# Patient Record
Sex: FEMALE | Race: WHITE | Employment: OTHER | URBAN - METROPOLITAN AREA
[De-identification: names, ages, dates, MRNs, and addresses within clinical notes are randomized per-mention and may not be internally consistent; named-entity substitution may affect disease eponyms.]

---

## 2017-02-10 ENCOUNTER — DOCUMENTATION ONLY (OUTPATIENT)
Dept: OTHER | Facility: CLINIC | Age: 82
End: 2017-02-10

## 2017-02-10 DIAGNOSIS — Z71.89 ADVANCED DIRECTIVES, COUNSELING/DISCUSSION: Primary | Chronic | ICD-10-CM

## 2017-02-22 VITALS
TEMPERATURE: 97 F | DIASTOLIC BLOOD PRESSURE: 68 MMHG | HEART RATE: 77 BPM | OXYGEN SATURATION: 95 % | WEIGHT: 123 LBS | RESPIRATION RATE: 18 BRPM | SYSTOLIC BLOOD PRESSURE: 124 MMHG | HEIGHT: 68 IN | BODY MASS INDEX: 18.64 KG/M2

## 2017-02-22 RX ORDER — LACTOSE-REDUCED FOOD
8 LIQUID (ML) ORAL
COMMUNITY
End: 2019-01-01

## 2017-02-23 ENCOUNTER — NURSING HOME VISIT (OUTPATIENT)
Dept: GERIATRICS | Facility: CLINIC | Age: 82
End: 2017-02-23
Payer: COMMERCIAL

## 2017-02-23 DIAGNOSIS — M80.88XG OSTEOPOROTIC COMPRESSION FRACTURE OF SPINE, WITH DELAYED HEALING, SUBSEQUENT ENCOUNTER: ICD-10-CM

## 2017-02-23 DIAGNOSIS — I48.0 PAF (PAROXYSMAL ATRIAL FIBRILLATION) (H): ICD-10-CM

## 2017-02-23 DIAGNOSIS — F02.80 LATE ONSET ALZHEIMER'S DISEASE WITHOUT BEHAVIORAL DISTURBANCE (H): ICD-10-CM

## 2017-02-23 DIAGNOSIS — Z86.73 HISTORY OF CVA (CEREBROVASCULAR ACCIDENT): ICD-10-CM

## 2017-02-23 DIAGNOSIS — K59.01 SLOW TRANSIT CONSTIPATION: ICD-10-CM

## 2017-02-23 DIAGNOSIS — I50.42 CHRONIC COMBINED SYSTOLIC AND DIASTOLIC CONGESTIVE HEART FAILURE (H): Primary | ICD-10-CM

## 2017-02-23 DIAGNOSIS — G30.1 LATE ONSET ALZHEIMER'S DISEASE WITHOUT BEHAVIORAL DISTURBANCE (H): ICD-10-CM

## 2017-02-23 PROCEDURE — 99308 SBSQ NF CARE LOW MDM 20: CPT | Performed by: INTERNAL MEDICINE

## 2017-02-23 NOTE — PROGRESS NOTES
"Essie Jernigan is a 89 year old female seen February 23, 2017 at [facility] where she has resided for 5 years (admit 2012) seen for routine visit.   Patient is seen in her room, resting abed late morning.   Pleasant and smiling, states she feels well.   \"I'm very wealthy you know, but I'm not giving any of it away.\"     Chart reviewed for pertinent medical history, which patient is not able to give.   In LTC since fall with pelvic fracture in 2012, unable to return home after TCU stay.   Had frontal lobe CVI in 2013, with resulting cognitive changes, no hospitalization since then.   Has a h/o HTN but not currently needing bp lowering meds; past h/o diastolic CHF    PMH:  PAF /Atrial flutter  Chronic diastolic CHF   ECHO 60-65% in 2013, JAMESON, MR, aortic sclerosis  Dementia, late onset  S/p right frontal lobe CVI, 2013  HTN  S/p hip fracture, 2007  S/p pelvic fracture, 2012  BCC, face  OA, s/p PADDY    SH:  , previously lived independently in an apartment.   Daughter, grandchildren live locally.     ROS:  Limited, but negative except for above.     EXAM:  Pleasant, NAD  /68  Pulse 77  Temp 97  F (36.1  C)  Resp 18  Ht 5' 8\" (1.727 m)  Wt 123 lb (55.8 kg)  SpO2 95%  BMI 18.7 kg/m2   +kyphosis  Neck supple without adenopathy  Lungs clear bilaterally with fair air movement  Heart RRR s1s2, 1/6 HSM  Abd soft, NT, no distention, +BS  Ext without edema, WC bound  Psych: affect pleasant  Neuro: non-focal.      Lab Results   Component Value Date    HGB 12.5 12/07/2016        Last Basic Metabolic Panel:  Lab Results   Component Value Date     12/07/2016      Lab Results   Component Value Date    POTASSIUM 4.3 12/07/2016     Lab Results   Component Value Date    CHLORIDE 105 12/07/2016     Lab Results   Component Value Date    RIK 8.1 12/07/2016     Lab Results   Component Value Date    CO2 29 12/07/2016     Lab Results   Component Value Date    BUN 23 12/07/2016     Lab Results   Component Value Date    CR 0.85 " 12/07/2016     Lab Results   Component Value Date    GLC 77 12/07/2016     TSH   Date Value Ref Range Status   12/07/2016 4.89 (H) 0.40 - 4.00 mU/L Final       IMP/PLAN:  (I50.42) Chronic combined systolic and diastolic congestive heart failure (H)   Comment: longstanding dx but stable fluid status for >one year.   Plan: follow for symptoms, treat if uncomfortable.       (I48.0) PAF (paroxysmal atrial fibrillation) (H)  Comment: atrial flutter, distant past; regular today  Plan: daily low dose aspirin, no need for rate slowing meds recently.     (G30.1,  F02.80) Late onset Alzheimer's disease without behavioral disturbance  Comment: general decline in STM and function  Plan: LTC support for meds, meals, activity, cares.       (M80.88XG) Osteoporotic compression fracture of spine, with delayed healing, subsequent encounter  Comment: occ pain  Plan: local measures, prn analgesia  Continue calcium, vit D for now    (Z86.73) History of CVA (cerebrovascular accident)  Comment: right frontal lobe  Plan: continue ASA    (K59.01) Slow transit constipation  Comment: well-managed  Plan: continue daily Senna, monitor    AD: DNR/DNI, comfort approach     Alejandra Orozco MD

## 2017-04-18 ENCOUNTER — NURSING HOME VISIT (OUTPATIENT)
Dept: GERIATRICS | Facility: CLINIC | Age: 82
End: 2017-04-18
Payer: COMMERCIAL

## 2017-04-18 VITALS
HEIGHT: 68 IN | TEMPERATURE: 98.9 F | DIASTOLIC BLOOD PRESSURE: 79 MMHG | WEIGHT: 122.8 LBS | OXYGEN SATURATION: 96 % | SYSTOLIC BLOOD PRESSURE: 152 MMHG | HEART RATE: 85 BPM | RESPIRATION RATE: 18 BRPM | BODY MASS INDEX: 18.61 KG/M2

## 2017-04-18 DIAGNOSIS — I10 ESSENTIAL HYPERTENSION, BENIGN: ICD-10-CM

## 2017-04-18 DIAGNOSIS — G30.1 LATE ONSET ALZHEIMER'S DISEASE WITHOUT BEHAVIORAL DISTURBANCE (H): ICD-10-CM

## 2017-04-18 DIAGNOSIS — F02.80 LATE ONSET ALZHEIMER'S DISEASE WITHOUT BEHAVIORAL DISTURBANCE (H): ICD-10-CM

## 2017-04-18 DIAGNOSIS — I50.42 CHRONIC COMBINED SYSTOLIC AND DIASTOLIC CONGESTIVE HEART FAILURE (H): Primary | ICD-10-CM

## 2017-04-18 PROCEDURE — 99309 SBSQ NF CARE MODERATE MDM 30: CPT | Performed by: NURSE PRACTITIONER

## 2017-04-18 NOTE — PROGRESS NOTES
[place] [facility]    Chief Complaint   Patient presents with     USP Regulatory       HPI:    Essie Jernigan is a 89 year old  (8/9/1920), who is being seen today for a federally mandated E/M visit at [facility]. Today's concerns are:  Chronic combined systolic and diastolic congestive heart failure (H)  Essential hypertension, benign  Denies any dizziness sob, chest pain or edema. Blood pressures ranging between 102//88.    Late onset Alzheimer's disease without behavioral disturbance  Weight has been stable at 122.8lbs. Feeds herself and toilets self. Denies any discomfort.       ALLERGIES: Review of patient's allergies indicates no known allergies.  PAST MEDICAL HISTORY:  has a past medical history of Irregular heart beat and Pelvic fracture (H) (11/14/12).  PAST SURGICAL HISTORY:  has a past surgical history that includes GYN surgery (hyst); orthopedic surgery (wrist, hip replacement); and appendectomy.  FAMILY HISTORY: family history is not on file.  SOCIAL HISTORY:  reports that she has never smoked. She does not have any smokeless tobacco history on file. She reports that she does not drink alcohol.    MEDICATIONS:  Current Outpatient Prescriptions   Medication Sig Dispense Refill     Nutritional Supplements (ENSURE PLUS) LIQD Take 8 oz by mouth 3 times daily (with meals)       AMOXICILLIN PO Give 2 grams by mouth 1 hour prior to dental appts       senna-docusate (SENOKOT-S;PERICOLACE) 8.6-50 MG per tablet Take 1 tablet by mouth 2 times daily       VITAMIN D, CHOLECALCIFEROL, PO Take 2,000 Units by mouth daily       aspirin 81 MG tablet Take by mouth daily       acetaminophen (TYLENOL) 325 MG tablet Take 650 mg by mouth every 4 hours as needed        calcium carb 1250 mg, 500 mg [place],/vitamin D 200 units (OSCAL WITH D) 500-200 MG-UNIT per tablet Take 1 tablet by mouth 2 times daily (with meals). 100 tablet      multivitamin, therapeutic (THERA-VIT) TABS Take 1  "tablet by mouth daily.       Medications reviewed:  Medications reconciled to facility chart and changes were made to reflect current medications as identified as above med list. Below are the changes that were made:   Medications stopped since last EPIC medication reconciliation:   There are no discontinued medications.    Medications started since last [facility] medication reconciliation:  No orders of the defined types were placed in this encounter.      Case Management:  I have reviewed the care plan and MDS and do agree with the plan. Patient's desire to return to the community is not assessible due to cognitive impairment.  Information reviewed:  Medications, vital signs, orders, and nursing notes.    ROS:  4 point ROS including Respiratory, CV, GI and , other than that noted in the HPI,  is negative    Exam:  /79  Pulse 85  Temp 98.9  F (37.2  C)  Resp 18  Ht 5' 8\" (1.727 m)  Wt 122 lb 12.8 oz (55.7 kg)  SpO2 96%  BMI 18.67 kg/m2  GENERAL APPEARANCE:  Alert, in no distress  ENT:  Mouth and posterior oropharynx normal, moist mucous membranes, hearing acuity normal  EYES:  EOM, conjunctivae, lids, pupils and irises normal  NECK:  No adenopathy,masses or thyromegaly  RESP:  respiratory effort and palpation of chest normal, no respiratory distress, Lung sounds clear bilaterally  CV:  Palpation and auscultation of heart done , rate and rhythm regular, no murmur, no rub or gallop, Edema none  ABDOMEN:  normal bowel sounds, soft, nontender, no hepatosplenomegaly or other masses  M/S:   Gait and station unsteady gait, wheelchair bound, Digits and nails normal  SKIN:  Inspection/Palpation of skin and subcutaneous tissue normal  NEURO: 2-12 in normal limits and at patient's baseline  PSYCH:  insight and judgement, memory impaired, affect and mood normal, jovial mood.    BP: 146-167/77-88  P: 85-97  Wt Readings from Last 5 Encounters:   04/18/17 122 lb 12.8 oz (55.7 kg)   02/22/17 123 lb (55.8 kg)   12/06/16 " 114 lb 12.8 oz (52.1 kg)   06/20/16 115 lb 11.2 oz (52.5 kg)   04/14/16 115 lb 12.8 oz (52.5 kg)         Lab/Diagnostic data:    No recent labs      ASSESSMENT/PLAN  (I50.42) Chronic combined systolic and diastolic congestive heart failure (H)  (primary encounter diagnosis)  Comment: chronic but controlled without medications  Plan: continue current plan of care    (I10) Essential hypertension, benign  Comment: controlled, not on medication  Plan: continue current plan of care    (G30.1,  F02.80) Late onset Alzheimer's disease without behavioral disturbance  Comment: chronic.  Plan: continue current plan of care    Orders:  No new orders.    Total time spent with patient visit was 35 min including patient visit and review of past records.Greater than 50% of total time spent with counseling and coordinating care.    Electronically signed by:  DEAN Ferro CNP

## 2017-06-01 ENCOUNTER — NURSING HOME VISIT (OUTPATIENT)
Dept: GERIATRICS | Facility: CLINIC | Age: 82
End: 2017-06-01
Payer: COMMERCIAL

## 2017-06-01 VITALS
BODY MASS INDEX: 19.02 KG/M2 | HEART RATE: 95 BPM | TEMPERATURE: 95.8 F | RESPIRATION RATE: 18 BRPM | OXYGEN SATURATION: 98 % | SYSTOLIC BLOOD PRESSURE: 113 MMHG | WEIGHT: 125.5 LBS | DIASTOLIC BLOOD PRESSURE: 74 MMHG | HEIGHT: 68 IN

## 2017-06-01 DIAGNOSIS — G30.1 LATE ONSET ALZHEIMER'S DISEASE WITHOUT BEHAVIORAL DISTURBANCE (H): Primary | ICD-10-CM

## 2017-06-01 DIAGNOSIS — M80.88XG OSTEOPOROTIC COMPRESSION FRACTURE OF SPINE, WITH DELAYED HEALING, SUBSEQUENT ENCOUNTER: ICD-10-CM

## 2017-06-01 DIAGNOSIS — F02.80 LATE ONSET ALZHEIMER'S DISEASE WITHOUT BEHAVIORAL DISTURBANCE (H): Primary | ICD-10-CM

## 2017-06-01 DIAGNOSIS — I50.42 CHRONIC COMBINED SYSTOLIC AND DIASTOLIC CONGESTIVE HEART FAILURE (H): ICD-10-CM

## 2017-06-01 PROCEDURE — 99308 SBSQ NF CARE LOW MDM 20: CPT | Performed by: INTERNAL MEDICINE

## 2017-06-01 PROCEDURE — 99207 ZZC CDG-CORRECTLY CODED, REVIEWED AND AGREE: CPT | Performed by: INTERNAL MEDICINE

## 2017-06-10 NOTE — PROGRESS NOTES
"Essie Jernigan is a 89 year old female seen June 1, 2017 at [facility] where she has resided for 5 years (admit 2012) seen for routine visit.   Patient is seen in her room, up to WC.   She is pleasant and conversational, states she feels well.   Denies pain, dyspnea or other symptoms.   Spirits are good.     Chart reviewed for pertinent medical history: In LTC since fall with pelvic fracture in 2012.   Had frontal lobe CVI in 2013, with resulting cognitive changes, no hospitalization since then.   Has a h/o HTN but not currently needing bp lowering meds; past h/o diastolic CHF    PMH:  PAF /Atrial flutter  Chronic diastolic CHF   ECHO 60-65% in 2013, JAMESON, MR, aortic sclerosis  Dementia, late onset  S/p right frontal lobe CVI, 2013  HTN  S/p hip fracture, 2007  S/p pelvic fracture, 2012  BCC, face  OA, s/p PADDY    SH:  , previously lived independently in an apartment.   Daughter, grandchildren live locally.     ROS:  Limited, but negative except for above.   Weight is stable.       EXAM:  Pleasant, NAD  /74  Pulse 95  Temp 95.8  F (35.4  C)  Resp 18  Ht 5' 8\" (1.727 m)  Wt 125 lb 8 oz (56.9 kg)  SpO2 98%  BMI 19.08 kg/m2   +kyphosis  Neck supple without adenopathy  Lungs clear bilaterally with fair air movement  Heart RRR s1s2, 1/6 HSM  Abd soft, NT, no distention, +BS  Ext without edema, WC bound  Psych: affect pleasant  Neuro: non-focal.    No recent labs.      IMP/PLAN:  (I50.42) Chronic combined systolic and diastolic congestive heart failure (H)   Comment: longstanding dx but stable fluid status for >one year.   Plan: follow for symptoms, treat if uncomfortable.       (I48.0) PAF (paroxysmal atrial fibrillation) (H)  Comment: controlled VR without rate-slowing medications.    Plan: daily low dose aspirin    (G30.1,  F02.80) Late onset Alzheimer's disease without behavioral disturbance  Comment: gradual decline  Plan: LTC support for meds, meals, activity, cares.       (M80.88XG) Osteoporotic " compression fracture of spine  Comment: occ pain  Plan: local measures, prn analgesia  Continue calcium, vit D for now    (Z86.73) History of CVA (cerebrovascular accident)  Comment: right frontal lobe  Plan: continue ASA    (K59.01) Slow transit constipation  Comment: well-managed  Plan: continue daily Senna, monitor    AD: DNR/DNI, comfort approach     Alejandra Orozco MD

## 2017-08-03 ENCOUNTER — NURSING HOME VISIT (OUTPATIENT)
Dept: GERIATRICS | Facility: CLINIC | Age: 82
End: 2017-08-03
Payer: COMMERCIAL

## 2017-08-03 VITALS
DIASTOLIC BLOOD PRESSURE: 72 MMHG | OXYGEN SATURATION: 96 % | HEIGHT: 68 IN | TEMPERATURE: 97.6 F | WEIGHT: 124.2 LBS | SYSTOLIC BLOOD PRESSURE: 125 MMHG | BODY MASS INDEX: 18.82 KG/M2 | RESPIRATION RATE: 18 BRPM | HEART RATE: 70 BPM

## 2017-08-03 DIAGNOSIS — Z87.81 H/O COMPRESSION FRACTURE OF SPINE: ICD-10-CM

## 2017-08-03 DIAGNOSIS — F02.80 LATE ONSET ALZHEIMER'S DISEASE WITHOUT BEHAVIORAL DISTURBANCE (H): Primary | ICD-10-CM

## 2017-08-03 DIAGNOSIS — R53.81 PHYSICAL DECONDITIONING: ICD-10-CM

## 2017-08-03 DIAGNOSIS — Z86.73 HISTORY OF CVA (CEREBROVASCULAR ACCIDENT): ICD-10-CM

## 2017-08-03 DIAGNOSIS — G30.1 LATE ONSET ALZHEIMER'S DISEASE WITHOUT BEHAVIORAL DISTURBANCE (H): Primary | ICD-10-CM

## 2017-08-03 PROCEDURE — 99309 SBSQ NF CARE MODERATE MDM 30: CPT | Performed by: NURSE PRACTITIONER

## 2017-08-03 NOTE — PROGRESS NOTES
[place] [facility]    Chief Complaint   Patient presents with     long term Regulatory       HPI:    Essie Jernigan is a 89 year old  (8/9/1920), who is being seen today for a federally mandated E/M visit at [facility].  HPI information obtained from: facility chart records, facility staff, patient report and [facility] chart review. Today's concerns are:    H/o compression fracture.   No c/o pain today.   She is taking vit d and oscal daily. No recent falls or fractures.  Late onset Alzheimer's disease without behavioral disturbance  Pleasant, able [place] me [place]. She did go to an activity today.   History of CVA (cerebrovascular accident)  12/19/13- right frontal on MRI with left facial droop, dysarthria and confusion. Today with no focal deficits  BIMS 11/15  Physical deconditioning  Uses wheelchair to get about.       ALLERGIES: Review of patient's allergies indicates no known allergies.  PAST MEDICAL HISTORY:  has a past medical history of Irregular heart beat and Pelvic fracture (H) (11/14/12).  PAST SURGICAL HISTORY:  has a past surgical history that includes GYN surgery (hyst); orthopedic surgery (wrist, hip replacement); and appendectomy.  FAMILY HISTORY: family history is not on file.  SOCIAL HISTORY:  reports that she has never smoked. She does not have any smokeless tobacco history on file. She reports that she does not drink alcohol.    MEDICATIONS:  Current Outpatient Prescriptions   Medication Sig Dispense Refill     IBUPROFEN PO Take 400 mg by mouth every 6 hours as needed for moderate pain       Nutritional Supplements (ENSURE PLUS) LIQD Take 8 oz by mouth 3 times daily (with meals)       AMOXICILLIN PO Give 2 grams by mouth 1 hour prior to dental appts       senna-docusate (SENOKOT-S;PERICOLACE) 8.6-50 MG per tablet Take 1 tablet by mouth 2 times daily       VITAMIN D, CHOLECALCIFEROL, PO Take 2,000 Units by mouth daily       aspirin 81  "MG tablet Take by mouth daily       acetaminophen (TYLENOL) 325 MG tablet Take 650 mg by mouth every 6 hours as needed        multivitamin, therapeutic (THERA-VIT) TABS Take 1 tablet by mouth daily.       Medications reviewed:  Medications reconciled to facility chart and changes were made to reflect current medications as identified as above med list. Below are the changes that were made:   Medications stopped since last EPIC medication reconciliation:   Medications Discontinued During This Encounter   Medication Reason     calcium carb 1250 mg, 500 mg [place],/vitamin D 200 units (OSCAL WITH D) 500-200 MG-UNIT per tablet Medication Reconciliation Clean Up       Medications started since last [facility] medication reconciliation:  Orders Placed This Encounter   Medications     IBUPROFEN PO     Sig: Take 400 mg by mouth every 6 hours as needed for moderate pain         Case Management:  I have reviewed the care plan and MDS and do agree with the plan. Patient's desire to return to the community is not present.  Information reviewed:  Medications, vital signs, orders, and nursing notes.    ROS:  4 point ROS including Respiratory, CV, GI and , other than that noted in the HPI,  is negative    Exam:  Vitals: /72  Pulse 70  Temp 97.6  F (36.4  C)  Resp 18  Ht 5' 8\" (1.727 m)  Wt 124 lb 3.2 oz (56.3 kg)  SpO2 96%  BMI 18.88 kg/m2  BMI= Body mass index is 18.88 kg/(m^2).  GENERAL APPEARANCE:  Alert, in no distress  ENT:  Mouth and posterior oropharynx normal, moist mucous membranes, hearing acuity adequate   EYES:  EOM, conjunctivae, lids, pupils and irises normal  NECK:  No adenopathy,masses or thyromegaly  RESP:  respiratory effort and palpation of chest normal, no respiratory distress, Lung sounds clear  CV:  Palpation and auscultation of heart done , rate and rhythm reg, no murmur, no rub or gallop, Edema none  ABDOMEN:  normal bowel sounds, soft, nontender, no hepatosplenomegaly or other masses  M/S:   Gait and " station LE weakness. , Digits and nails normal   SKIN:  Inspection/Palpation of skin and subcutaneous tissue no rash  NEURO: 2-12 in normal limits and at patient's baseline  PSYCH:  insight and judgement, memory mild cognitive impairment  , affect and mood normal    BP: 125/72, 128/72, 172/87  P:   Wt Readings from Last 5 Encounters:   08/03/17 124 lb 3.2 oz (56.3 kg)   06/01/17 125 lb 8 oz (56.9 kg)   04/18/17 122 lb 12.8 oz (55.7 kg)   02/22/17 123 lb (55.8 kg)   12/06/16 114 lb 12.8 oz (52.1 kg)         Lab/Diagnostic data:    No recent labs        ASSESSMENT/PLAN  (G30.1,  F02.80) Late onset Alzheimer's disease without behavioral disturbance  (primary encounter diagnosis)  Comment: pleasant. Able to take care of immediate personal needs.   Plan: supportive caer    (Z87.81) H/O compression fracture of spine  Comment: no recent falls or episodes of pain  Plan: continue vit D and Calcium supp    (R53.81) Physical deconditioning  Comment: due to inactivity.   Plan: assist with exercises    (Z86.73) History of CVA (cerebrovascular accident)  Comment: nonfocal  Plan: supportive care        Electronically signed by:  DEAN Lei CNP

## 2017-08-10 ENCOUNTER — DOCUMENTATION ONLY (OUTPATIENT)
Dept: OTHER | Facility: CLINIC | Age: 82
End: 2017-08-10

## 2017-08-10 DIAGNOSIS — Z71.89 ADVANCED DIRECTIVES, COUNSELING/DISCUSSION: Chronic | ICD-10-CM

## 2017-10-19 VITALS
WEIGHT: 122.4 LBS | SYSTOLIC BLOOD PRESSURE: 108 MMHG | BODY MASS INDEX: 18.55 KG/M2 | HEIGHT: 68 IN | HEART RATE: 70 BPM | DIASTOLIC BLOOD PRESSURE: 70 MMHG | OXYGEN SATURATION: 95 % | TEMPERATURE: 97.7 F | RESPIRATION RATE: 18 BRPM

## 2017-10-20 ENCOUNTER — NURSING HOME VISIT (OUTPATIENT)
Dept: GERIATRICS | Facility: CLINIC | Age: 82
End: 2017-10-20
Payer: COMMERCIAL

## 2017-10-20 DIAGNOSIS — F02.80 LATE ONSET ALZHEIMER'S DISEASE WITHOUT BEHAVIORAL DISTURBANCE (H): ICD-10-CM

## 2017-10-20 DIAGNOSIS — G30.1 LATE ONSET ALZHEIMER'S DISEASE WITHOUT BEHAVIORAL DISTURBANCE (H): ICD-10-CM

## 2017-10-20 DIAGNOSIS — I50.42 CHRONIC COMBINED SYSTOLIC AND DIASTOLIC CONGESTIVE HEART FAILURE (H): ICD-10-CM

## 2017-10-20 DIAGNOSIS — K59.01 SLOW TRANSIT CONSTIPATION: ICD-10-CM

## 2017-10-20 DIAGNOSIS — Z86.73 HISTORY OF CVA (CEREBROVASCULAR ACCIDENT): Primary | ICD-10-CM

## 2017-10-20 PROCEDURE — 99308 SBSQ NF CARE LOW MDM 20: CPT | Performed by: INTERNAL MEDICINE

## 2017-10-20 PROCEDURE — 99207 ZZC CDG-CORRECTLY CODED, REVIEWED AND AGREE: CPT | Performed by: INTERNAL MEDICINE

## 2017-10-29 NOTE — PROGRESS NOTES
"Essie Jernigan is a 89 year old female seen October 20, 2017 at [facility] where she has resided for 5 years (admit 2012) seen for routine visit.     Patient is seen in her room, resting abed.   Denies pain, dyspnea or other symptoms.   Active intermittently, uses WC for mobility.     No new concerns reported by LT nursing staff.       Chart reviewed for pertinent medical history: In LTC since fall with pelvic fracture in 2012.   Had frontal lobe CVI in 2013, with resulting cognitive changes, no hospitalization since then.   Has a h/o HTN but not currently needing bp lowering meds; she has a h/o diastolic CHF    PMH:  PAF /Atrial flutter  Chronic diastolic CHF   ECHO 60-65% in 2013, JAMESON, MR, aortic sclerosis  Dementia, late onset  S/p right frontal lobe CVI, 2013  HTN  S/p hip fracture, 2007  S/p pelvic fracture, 2012  BCC, face  OA, s/p PADDY    SH:  , previously lived independently in an apartment.   Daughter, grandchildren live locally.     ROS:  Limited, but negative except for above.   Weight is stable.       EXAM:  Pleasant, NAD  /70  Pulse 70  Temp 97.7  F (36.5  C)  Resp 18  Ht 5' 8\" (1.727 m)  Wt 122 lb 6.4 oz (55.5 kg)  SpO2 95%  BMI 18.61 kg/m2   +kyphosis  Neck supple without adenopathy  Lungs clear bilaterally with fair air movement  Heart RRR s1s2, 1/6 HSM  Abd soft, NT, no distention, +BS  Ext without edema, WC bound  Psych: affect pleasant  Neuro: non-focal.    No recent labs.      IMP/PLAN:  (I50.42) Chronic combined systolic and diastolic congestive heart failure (H)   Comment: longstanding dx but stable fluid status for >one year.   Plan: follow for symptoms, treat if uncomfortable.       (I48.0) PAF (paroxysmal atrial fibrillation) (H)  Comment: controlled VR without rate-slowing medications.    Plan: daily low dose aspirin    (G30.1,  F02.80) Late onset Alzheimer's disease without behavioral disturbance  Comment: gradual decline  Plan: LTC support for meds, meals, activity, " bety.       (M80.88XG) Osteoporotic compression fracture of spine  Comment: occ pain  Plan: local measures, prn analgesia  Continue calcium, vit D for now  Would d/c ibuprofen.      (Z86.73) History of CVA (cerebrovascular accident)  Comment: right frontal lobe  Plan: continue ASA for secondary prevention.     (K59.01) Slow transit constipation  Comment: well-managed  Plan: continue daily Senna, monitor    AD: DNR/DNI, comfort approach     Alejandra Orozco MD

## 2017-11-22 VITALS
BODY MASS INDEX: 19.4 KG/M2 | RESPIRATION RATE: 18 BRPM | HEIGHT: 68 IN | HEART RATE: 70 BPM | WEIGHT: 128 LBS | SYSTOLIC BLOOD PRESSURE: 108 MMHG | OXYGEN SATURATION: 95 % | DIASTOLIC BLOOD PRESSURE: 70 MMHG | TEMPERATURE: 97.7 F

## 2017-11-22 ASSESSMENT — PATIENT HEALTH QUESTIONNAIRE - PHQ9: SUM OF ALL RESPONSES TO PHQ QUESTIONS 1-9: 0

## 2017-11-22 NOTE — PROGRESS NOTES
"[facility]  Chief Complaint   Patient presents with     Annual Comprehensive Nursing Home       HPI:    Essie Jernigan is a 89 year old  (8/9/1920) female with PMH significant for PAF/a.flutter, HFpEF, dementida, hx of CVA (right frontal lobe), OA (s/p PADDY), who is being seen today for an annual comprehensive visit at [facility] (formerly [facility]).  HPI information obtained from: facility chart records, facility staff, patient report and [facility] chart review.      Today's concerns are:  Late onset Alzheimer's disease without behavioral disturbance  When I ask how old she is, tells me 89. Then says: \"I feel pretty good for 89 years old!\"  Scored 16/30 in SLUMS in September 2013.  Has resided on LTC unit since 2012 after TCU stay for rehab after L superior and inferior pubic rami fracture.  Needs assistance with ADLs, mobility, safety, medications, and meals.  Her mood and behavior have been stable per staff and family.     Chronic combined systolic and diastolic congestive heart failure (H)  Essential hypertension, benign  Not on any cardiovascular medications.  No CP or SOB. No leg swelling.  Recent VS reviewed:  BP: 108-117/64-89  P: 70-81    Slow transit constipation  Having regular BMs.On senna-s one tablet PO BID.  No abd pain. No N/V.   Good appetite: \"I eat everything, like a little piggy.\"  She is thin, but weight is stable ~ 125#.  She is on nutritional supplements.  Daughter reports she will sometimes forget if she has eaten or not.    H/O compression fracture of spine  No back pain today.  Location of fractures unclear.  Good bed mobility, WC bound.   Has not used PRN ibuprofen in last month.   PRN Tylenol in place for mild pain.     History of CVA (cerebrovascular accident)  Suffered R frontal lobe CVA in December 2013.  Had transient left sided weakness.  Started on full strength ASA and Statin.  Current only on baby aspirin. Statin stopped due to age and goals of care.    Advance " Care Planning  POLST last reviewed 12/07/16 - DNR/DNI.     ALLERGIES: Review of patient's allergies indicates no known allergies.    PROBLEM LIST:  Patient Active Problem List   Diagnosis     Chronic combined systolic and diastolic congestive heart failure (H)     Constipation     BCC (basal cell carcinoma), face     History of CVA (cerebrovascular accident)     Essential hypertension, benign     Back pain     Generalized pain     Physical deconditioning     Osteoporotic compression fracture of spine, with delayed healing, subsequent encounter     H/O compression fracture of spine     Late onset Alzheimer's disease without behavioral disturbance     Advance Care Planning     PAST MEDICAL HISTORY:  has a past medical history of Irregular heart beat and Pelvic fracture (H) (11/14/12).    PAST SURGICAL HISTORY:  has a past surgical history that includes GYN surgery (hyst); orthopedic surgery (wrist, hip replacement); and appendectomy.    FAMILY HISTORY: family history is not on file.    SOCIAL HISTORY:  reports that she has never smoked. She does not have any smokeless tobacco history on file. She reports that she does not drink alcohol.   , previously lived independently in an apartment.   Daughter, grandchildren live locally. Resident of [facility] since 2012.    IMMUNIZATIONS:  Most Recent Immunizations   Administered Date(s) Administered     Influenza (High Dose) 3 valent vaccine 10/23/2017     Influenza (IIV3) PF 10/14/2016     Pneumococcal (PCV 13) 11/04/2015     Pneumococcal 23 valent 09/12/2013     Tdap (Adacel,Boostrix) 11/09/2015     Above immunizations pulled from [facility]. [facility] and facility records also reconciled. Outstanding information sent to  to update [facility].  Future immunizations are not needed at this point as all recommended immunizations are up to date.   MEDICATIONS:  Current Outpatient Prescriptions   Medication Sig Dispense Refill     Nutritional Supplements  (NUTRITIONAL DRINK PO) Take 6 oz by mouth daily (with breakfast)       IBUPROFEN PO Take 400 mg by mouth every 6 hours as needed for moderate pain       Nutritional Supplements (ENSURE PLUS) LIQD Take 8 oz by mouth 3 times daily (with meals)       AMOXICILLIN PO Give 2 grams by mouth 1 hour prior to dental appts       senna-docusate (SENOKOT-S;PERICOLACE) 8.6-50 MG per tablet Take 1 tablet by mouth 2 times daily       VITAMIN D, CHOLECALCIFEROL, PO Take 2,000 Units by mouth daily       aspirin 81 MG tablet Take by mouth daily       acetaminophen (TYLENOL) 325 MG tablet Take 650 mg by mouth every 6 hours as needed        multivitamin, therapeutic (THERA-VIT) TABS Take 1 tablet by mouth daily.       Medications reviewed:  Medications reconciled to facility chart and changes were made to reflect current medications as identified as above med list. Below are the changes that were made:   Medications stopped since last EPIC medication reconciliation:   There are no discontinued medications.    Medications started since last [facility] medication reconciliation:  No orders of the defined types were placed in this encounter.    Case Management:  I have reviewed the facility/SNF care plan/MDS which was done 9/19/17, including the falls risk, nutrition and pain screening. I also reviewed the current immunizations, and preventive care..Future cancer screening is not clinically indicated secondary to age/goals of care Patient's desire to return to the community is not assessible due to cognitive impairment. Current Level of Care is appropriate.    Advance Directive Discussion:    I reviewed the current advanced directives as reflected in EPIC, the POLST and the facility chart, and verified the congruency of orders. I contacted the first party daughter (Vivian) and discussed the plan of Care.  I did not due to cognitive impairment review the advance directives with the resident.     Team Discussion:  I communicated with the  "appropriate disciplines involved with the Plan of Care:   Nursing:  nurse Jo-Ann.    Patient Goal:  Patient's goal is unobtainable secondary to cognitive impairment and family's/responsible party's goal for the patient is focus on comfort and QoL.    Information reviewed:  Medications, vital signs, orders, and nursing notes.    ROS:  Limited meanginful hx secondary to cognitive impairment, but today pt reports 4 point ROS including Respiratory, CV, GI and , other than that noted in the HPI,  is negative    Exam:  /70  Pulse 70  Temp 97.7  F (36.5  C)  Resp 18  Ht 5' 8\" (1.727 m)  Wt 128 lb (58.1 kg)  SpO2 95%  BMI 19.46 kg/m2  GENERAL APPEARANCE:  Alert, in no distress, thin, well dressed w/ jewelry, sleeping on top of bed covers w/ blanket over her head  EYES: lids normal, sclera clear and conjunctiva normal, no discharge   ENT:  Mouth normal, moist mucous membranes, nose without drainage or crusting, hearing acuity good for age   NECK:  Nontender, supple, symmetrical; no cervical adenopathy, masses or thyromegaly, trachea midline  RESP:  Non-labored breathing, palpation of chest normal, no chest wall tenderness, no respiratory distress, Lung sounds clear but slightly diminished t/o, no rales/wheezes/rhonchi, patient is on room air  CV:  Palpation - no murmur/non-displaced PMI, Auscultation - rate and rhythm regualr with occasional early beat, no murmur, no rub or gallop.  VASCULAR: No edeam bilateral lower extremities. Feet are pink, slight cool, but even temp BL w/ cap refill < 2 secs.   ABDOMEN:  Flat abd, normal bowel sounds, soft, nontender, no grimacing or guarding with palpation. No hepatosplenomegaly. No appreciable masses.  M/S:   Gait and station wheelchair bound. Turns in bed independently for exam. slight flexion contracture left knee, knee w/o tenderness to palpation of swelling  SKIN:  Inspection - no visible rashes/lesions/uclerations. Palpation- no increased warmth, skin is dry and " non-tender.  NEURO: cranial nerves II-XII grossly intact, no facial asymmetry, follows simple commands, moves all extremities symmetrically, normal tone, no tremor  PSYCH: awake and alert, speech fluent,  insight and judgement impaired, affable - makes joints, cooperative w/ exam       Wt Readings from Last 5 Encounters:   11/22/17 128 lb (58.1 kg)   10/19/17 122 lb 6.4 oz (55.5 kg)   08/03/17 124 lb 3.2 oz (56.3 kg)   06/01/17 125 lb 8 oz (56.9 kg)   04/18/17 122 lb 12.8 oz (55.7 kg)     Lab/Diagnostic data:    CBC RESULTS:   Recent Labs   Lab Test  12/07/16   0500  12/20/13   0805  12/19/13   1423   WBC   --   4.8  5.2   RBC   --   3.57*  3.67*   HGB  12.5  12.2  12.5   HCT   --   35.7  37.0   MCV   --   100  101*   MCH   --   34.2*  34.1*   MCHC   --   34.2  33.8   RDW   --   13.3  13.3   PLT   --   125*  142*       Last Basic Metabolic Panel:  Recent Labs   Lab Test  12/07/16   0500 09/01/15  12/21/13   0821   NA  141  138  138   POTASSIUM  4.3  4.3  4.1   CHLORIDE  105  104  107   RIK  8.1*  8.1  8.5   CO2  29   --   27   BUN  23  22  16   CR  0.85  0.81  0.69   GLC  77  83  98       Liver Function Studies -   Recent Labs   Lab Test  01/02/10   0740   PROTTOTAL  5.4*   ALBUMIN  2.6*   BILITOTAL  0.7   ALKPHOS  64   AST  23   ALT  7       TSH   Date Value Ref Range Status   12/07/2016 4.89 (H) 0.40 - 4.00 mU/L Final   12/19/2013 2.90 0.4 - 5.0 mU/L Final       Lab Results   Component Value Date    A1C 5.5 12/20/2013       ASSESSMENT/PLAN  (G30.1,  F02.80) Late onset Alzheimer's disease without behavioral disturbance  (primary encounter diagnosis)  Comment: slow progressive decline in function and cognition, mood and behaviors stable  Depression screen done: PHQ-9 (score 0/27) Given screen score and clinical assessment patient is stable without any signs of depression and no futher interventions warrented at this time.  Plan:  - Continue supportive care in LTC environment   - Focus on comfort and QoL    (I50.42)  Chronic combined systolic and diastolic congestive heart failure (H)  (I10) Essential hypertension, benign  Comment: stable off antihypertensive medications  Based on JNC-8 goals,  patients age of 89 year old, no presence of diabetes or CKD, and goals of care goal BP is <150/90 mm Hg. patient is stable and continue without pharmacological invention with routine assessment.  Plan:   - Monitor weights, periodic labs, clinical status, and VS  - Continue ASA 81 mg daily     (K59.01) Slow transit constipation  Comment: stable, having regular BMs  Plan:   - Continue senna-s one tablet PO BID.    (Z87.81) H/O compression fracture of spine  Comment: No apparent back pain, has PRN ibuprofen and acetaminophen - neither used in last month   Plan:   - d/c ibuprofen - hasn't used in last month and more risk with SE profile than acetaminophen    (Z86.73) History of CVA (cerebrovascular accident)  Comment: occurred in 2013, R frontal lobe   Plan:   - Continue ASA 81 mg daily for secondary prevention  - statin stopped due to age and goals of care    (Z71.89) Advance Care Planning  Comment: goal of care is comfort and QoL   Plan:   - Called daughter Vivian to review POC, no change to POLST, confirms focus on comfort and QoL.     Orders:  - d/c ibuprofen     Electronically signed by:  DEAN Purcell CNP

## 2017-11-24 ENCOUNTER — NURSING HOME VISIT (OUTPATIENT)
Dept: GERIATRICS | Facility: CLINIC | Age: 82
End: 2017-11-24
Payer: COMMERCIAL

## 2017-11-24 DIAGNOSIS — I10 ESSENTIAL HYPERTENSION, BENIGN: ICD-10-CM

## 2017-11-24 DIAGNOSIS — F02.80 LATE ONSET ALZHEIMER'S DISEASE WITHOUT BEHAVIORAL DISTURBANCE (H): Primary | ICD-10-CM

## 2017-11-24 DIAGNOSIS — Z71.89 ADVANCED DIRECTIVES, COUNSELING/DISCUSSION: Chronic | ICD-10-CM

## 2017-11-24 DIAGNOSIS — I50.42 CHRONIC COMBINED SYSTOLIC AND DIASTOLIC CONGESTIVE HEART FAILURE (H): ICD-10-CM

## 2017-11-24 DIAGNOSIS — Z87.81 H/O COMPRESSION FRACTURE OF SPINE: ICD-10-CM

## 2017-11-24 DIAGNOSIS — G30.1 LATE ONSET ALZHEIMER'S DISEASE WITHOUT BEHAVIORAL DISTURBANCE (H): Primary | ICD-10-CM

## 2017-11-24 DIAGNOSIS — Z86.73 HISTORY OF CVA (CEREBROVASCULAR ACCIDENT): ICD-10-CM

## 2017-11-24 DIAGNOSIS — K59.01 SLOW TRANSIT CONSTIPATION: ICD-10-CM

## 2017-11-24 PROCEDURE — 99309 SBSQ NF CARE MODERATE MDM 30: CPT | Performed by: NURSE PRACTITIONER

## 2018-01-01 ENCOUNTER — NURSING HOME VISIT (OUTPATIENT)
Dept: GERIATRICS | Facility: CLINIC | Age: 83
End: 2018-01-01
Payer: COMMERCIAL

## 2018-01-01 VITALS
HEART RATE: 89 BPM | SYSTOLIC BLOOD PRESSURE: 137 MMHG | WEIGHT: 124.2 LBS | OXYGEN SATURATION: 95 % | RESPIRATION RATE: 20 BRPM | TEMPERATURE: 96.5 F | BODY MASS INDEX: 18.88 KG/M2 | DIASTOLIC BLOOD PRESSURE: 81 MMHG

## 2018-01-01 VITALS
BODY MASS INDEX: 19.46 KG/M2 | TEMPERATURE: 97.2 F | WEIGHT: 128 LBS | OXYGEN SATURATION: 97 % | HEART RATE: 92 BPM | DIASTOLIC BLOOD PRESSURE: 86 MMHG | SYSTOLIC BLOOD PRESSURE: 138 MMHG | RESPIRATION RATE: 16 BRPM

## 2018-01-01 VITALS
DIASTOLIC BLOOD PRESSURE: 83 MMHG | TEMPERATURE: 96.1 F | BODY MASS INDEX: 19.25 KG/M2 | RESPIRATION RATE: 19 BRPM | SYSTOLIC BLOOD PRESSURE: 133 MMHG | OXYGEN SATURATION: 93 % | WEIGHT: 127 LBS | HEIGHT: 68 IN | HEART RATE: 96 BPM

## 2018-01-01 VITALS
DIASTOLIC BLOOD PRESSURE: 76 MMHG | SYSTOLIC BLOOD PRESSURE: 108 MMHG | HEART RATE: 77 BPM | OXYGEN SATURATION: 94 % | TEMPERATURE: 97.2 F | RESPIRATION RATE: 18 BRPM

## 2018-01-01 DIAGNOSIS — I50.42 CHRONIC COMBINED SYSTOLIC AND DIASTOLIC CONGESTIVE HEART FAILURE (H): ICD-10-CM

## 2018-01-01 DIAGNOSIS — K59.01 SLOW TRANSIT CONSTIPATION: ICD-10-CM

## 2018-01-01 DIAGNOSIS — I10 ESSENTIAL HYPERTENSION, BENIGN: ICD-10-CM

## 2018-01-01 DIAGNOSIS — Z86.73 HISTORY OF CVA (CEREBROVASCULAR ACCIDENT): ICD-10-CM

## 2018-01-01 DIAGNOSIS — Z87.81 H/O COMPRESSION FRACTURE OF SPINE: ICD-10-CM

## 2018-01-01 DIAGNOSIS — I48.0 PAF (PAROXYSMAL ATRIAL FIBRILLATION) (H): ICD-10-CM

## 2018-01-01 DIAGNOSIS — G30.1 LATE ONSET ALZHEIMER'S DISEASE WITHOUT BEHAVIORAL DISTURBANCE (H): Primary | ICD-10-CM

## 2018-01-01 DIAGNOSIS — F02.80 MIXED ALZHEIMER'S AND VASCULAR DEMENTIA (H): ICD-10-CM

## 2018-01-01 DIAGNOSIS — Z71.89 ADVANCED DIRECTIVES, COUNSELING/DISCUSSION: ICD-10-CM

## 2018-01-01 DIAGNOSIS — G30.9 MIXED ALZHEIMER'S AND VASCULAR DEMENTIA (H): ICD-10-CM

## 2018-01-01 DIAGNOSIS — I48.0 PAF (PAROXYSMAL ATRIAL FIBRILLATION) (H): Primary | ICD-10-CM

## 2018-01-01 DIAGNOSIS — G30.1 LATE ONSET ALZHEIMER'S DISEASE WITHOUT BEHAVIORAL DISTURBANCE (H): ICD-10-CM

## 2018-01-01 DIAGNOSIS — F02.80 LATE ONSET ALZHEIMER'S DISEASE WITHOUT BEHAVIORAL DISTURBANCE (H): Primary | ICD-10-CM

## 2018-01-01 DIAGNOSIS — I87.303 STASIS EDEMA OF BOTH LOWER EXTREMITIES: ICD-10-CM

## 2018-01-01 DIAGNOSIS — F01.50 MIXED ALZHEIMER'S AND VASCULAR DEMENTIA (H): ICD-10-CM

## 2018-01-01 DIAGNOSIS — F02.80 LATE ONSET ALZHEIMER'S DISEASE WITHOUT BEHAVIORAL DISTURBANCE (H): ICD-10-CM

## 2018-01-01 PROCEDURE — 99318 ZZC ANNUAL NURSING FAC ASSESSMNT, STABLE: CPT | Performed by: NURSE PRACTITIONER

## 2018-01-01 PROCEDURE — 99309 SBSQ NF CARE MODERATE MDM 30: CPT | Performed by: INTERNAL MEDICINE

## 2018-01-01 PROCEDURE — 99308 SBSQ NF CARE LOW MDM 20: CPT | Performed by: INTERNAL MEDICINE

## 2018-01-01 PROCEDURE — 99309 SBSQ NF CARE MODERATE MDM 30: CPT | Performed by: NURSE PRACTITIONER

## 2018-01-23 VITALS
DIASTOLIC BLOOD PRESSURE: 66 MMHG | TEMPERATURE: 98.6 F | RESPIRATION RATE: 18 BRPM | WEIGHT: 122 LBS | OXYGEN SATURATION: 95 % | HEIGHT: 68 IN | SYSTOLIC BLOOD PRESSURE: 102 MMHG | BODY MASS INDEX: 18.49 KG/M2 | HEART RATE: 88 BPM

## 2018-01-24 ENCOUNTER — NURSING HOME VISIT (OUTPATIENT)
Dept: GERIATRICS | Facility: CLINIC | Age: 83
End: 2018-01-24
Payer: COMMERCIAL

## 2018-01-24 DIAGNOSIS — I50.42 CHRONIC COMBINED SYSTOLIC AND DIASTOLIC CONGESTIVE HEART FAILURE (H): Primary | ICD-10-CM

## 2018-01-24 DIAGNOSIS — F02.80 LATE ONSET ALZHEIMER'S DISEASE WITHOUT BEHAVIORAL DISTURBANCE (H): ICD-10-CM

## 2018-01-24 DIAGNOSIS — G30.1 LATE ONSET ALZHEIMER'S DISEASE WITHOUT BEHAVIORAL DISTURBANCE (H): ICD-10-CM

## 2018-01-24 DIAGNOSIS — I48.0 PAF (PAROXYSMAL ATRIAL FIBRILLATION) (H): ICD-10-CM

## 2018-01-24 DIAGNOSIS — Z86.73 HISTORY OF CVA (CEREBROVASCULAR ACCIDENT): ICD-10-CM

## 2018-01-24 PROCEDURE — 99309 SBSQ NF CARE MODERATE MDM 30: CPT | Performed by: INTERNAL MEDICINE

## 2018-01-30 PROBLEM — I48.0 PAF (PAROXYSMAL ATRIAL FIBRILLATION) (H): Status: ACTIVE | Noted: 2018-01-30

## 2018-01-30 NOTE — PROGRESS NOTES
"Essie Jernigan is a 89 year old female seen January 24, 2018 at [facility] where she has resided for 6 years (admit 2012) seen for routine visit.     Patient is seen in her room, resting abed.   States she feels well.   Active only intermittently, uses WC for mobility.      LTC nursing staff reports a fall last week, without injury.   Patient denies any pain today.    States he appetite is good and she sleeps well.      Chart reviewed for pertinent medical history: In LTC since fall with pelvic fracture in 2012.   Had frontal lobe CVI in 2013, with resulting cognitive changes, no hospitalization since then.   Has a h/o HTN but not currently needing bp lowering meds; she has a h/o diastolic CHF    PMH:  PAF /Atrial flutter  Chronic diastolic CHF   ECHO 60-65% in 2013, JAMESON, MR, aortic sclerosis  Dementia, late onset  S/p right frontal lobe CVI, 2013  HTN  S/p hip fracture, 2007  S/p pelvic fracture, 2012  BCC, face  OA, s/p PADDY    SH:  , previously lived independently in an apartment.   Daughter, grandchildren live locally.     ROS:  Limited, but negative except for above.    SLUMS 16/30  Wt Readings from Last 5 Encounters:   01/23/18 122 lb (55.3 kg)   11/22/17 128 lb (58.1 kg)   10/19/17 122 lb 6.4 oz (55.5 kg)   08/03/17 124 lb 3.2 oz (56.3 kg)   06/01/17 125 lb 8 oz (56.9 kg)        EXAM:  Pleasant, NAD  /66  Pulse 88  Temp 98.6  F (37  C)  Resp 18  Ht 5' 8\" (1.727 m)  Wt 122 lb (55.3 kg)  SpO2 95%  BMI 18.55 kg/m2   +kyphosis  Neck supple without adenopathy  Lungs clear bilaterally with fair air movement  Heart RRR s1s2, 1/6 HSM  Abd soft, NT, no distention, +BS  Ext without edema, WC bound  Psych: affect pleasant  Neuro: non-focal.    No recent labs.      IMP/PLAN:  (I50.42) Chronic combined systolic and diastolic congestive heart failure (H)   Comment: longstanding dx but stable fluid status for >one year.   Plan: follow for symptoms, treat if uncomfortable.       (I48.0) PAF (paroxysmal " atrial fibrillation) (H)  Comment: controlled VR without rate-slowing medications.    Plan: daily low dose aspirin    (G30.1,  F02.80) Late onset Alzheimer's disease without behavioral disturbance  Comment: gradual decline  Plan: LTC support for meds, meals, activity, cares.       (M80.88XG) Osteoporotic compression fracture of spine  Comment: occ pain  Plan: local measures, prn analgesia  Continue vit D     (Z86.73) History of CVA (cerebrovascular accident)  Comment: right frontal lobe  Plan: continue ASA for secondary prevention.     (K59.01) Slow transit constipation  Comment: well-managed  Plan: continue daily Senna, monitor    AD: DNR/DNI, comfort approach     Alejnadra Oroczo MD

## 2018-02-16 ENCOUNTER — CLINICAL UPDATE (OUTPATIENT)
Dept: PHARMACY | Facility: CLINIC | Age: 83
End: 2018-02-16

## 2018-02-16 NOTE — PROGRESS NOTES
This patient's medication list and chart were reviewed as part of the service provided by [facility].    Assessment/Recommendations:  No recommendations for changes at this time.    Savannah Sanford, Pharm.D.,[facility]  Board Certified Geriatric Pharmacist  Medication Therapy Management Pharmacist  [phone]

## 2018-03-05 ENCOUNTER — NURSING HOME VISIT (OUTPATIENT)
Dept: GERIATRICS | Facility: CLINIC | Age: 83
End: 2018-03-05
Payer: COMMERCIAL

## 2018-03-05 VITALS
SYSTOLIC BLOOD PRESSURE: 111 MMHG | RESPIRATION RATE: 18 BRPM | BODY MASS INDEX: 18.64 KG/M2 | WEIGHT: 123 LBS | OXYGEN SATURATION: 98 % | HEIGHT: 68 IN | DIASTOLIC BLOOD PRESSURE: 67 MMHG | HEART RATE: 69 BPM | TEMPERATURE: 98.4 F

## 2018-03-05 DIAGNOSIS — K59.01 SLOW TRANSIT CONSTIPATION: ICD-10-CM

## 2018-03-05 DIAGNOSIS — F02.80 LATE ONSET ALZHEIMER'S DISEASE WITHOUT BEHAVIORAL DISTURBANCE (H): Primary | ICD-10-CM

## 2018-03-05 DIAGNOSIS — I10 ESSENTIAL HYPERTENSION, BENIGN: ICD-10-CM

## 2018-03-05 DIAGNOSIS — G30.1 LATE ONSET ALZHEIMER'S DISEASE WITHOUT BEHAVIORAL DISTURBANCE (H): Primary | ICD-10-CM

## 2018-03-05 DIAGNOSIS — Z87.81 H/O COMPRESSION FRACTURE OF SPINE: ICD-10-CM

## 2018-03-05 DIAGNOSIS — I50.42 CHRONIC COMBINED SYSTOLIC AND DIASTOLIC CONGESTIVE HEART FAILURE (H): ICD-10-CM

## 2018-03-05 DIAGNOSIS — Z86.73 HISTORY OF CVA (CEREBROVASCULAR ACCIDENT): ICD-10-CM

## 2018-03-05 PROCEDURE — 99309 SBSQ NF CARE MODERATE MDM 30: CPT | Performed by: NURSE PRACTITIONER

## 2018-03-05 NOTE — PROGRESS NOTES
[facility]    Chief Complaint   Patient presents with     snf Regulatory       HPI:    Essie Jernigan is a 89 year old  (8/9/1920), who is being seen today for a federally mandated E/M visit at [facility].  HPI information obtained from: facility chart records, facility staff, patient report and [facility] chart review.     Today's concerns are:  Late onset Alzheimer's disease without behavioral disturbance  Scored 16/30 in SLUMS in September 2013.  12/21/17: BIMS=10/15 --> down from 13/15 in 11/2014  Has resided on LTC unit since 2012 after TCU stay for rehab after pelvic fracture.  Needs assistance with ADLs, mobility, safety, medications, and meals.  No longer ambulatory, but can stand to transfer. No recent falls.  Her mood and behavior have been stable.    Chronic combined systolic and diastolic congestive heart failure (H)  Essential hypertension, benign  Also hx of aflutter.  Last echo 12/19/13 and showed EF 60-65%.  Not on any cardiovascular medications.  Metoprolol stopped due to bradycardia.  No CP or SOB. No leg swelling.  Weight is 123#, stable from last visit.  BP Readings from Last 3 Encounters:   03/05/18 111/67   01/23/18 102/66   11/22/17 108/70   P: 69-73    Slow transit constipation  Having regular BMs.On senna-s one tablet PO BID.  No abd pain. No N/V.   She is thin, but weight is stable ~ 123#.  She is on nutritional supplements and multivitamin.    H/O compression fracture of spine  Compression deformities of lower lumbar vertebra L3-L5 and moderate age-in-determinant compression fractures of T8-T11.  No back pain today.  Good bed mobility, WC bound.   PRN Tylenol in place for mild pain.     History of CVA (cerebrovascular accident)  R frontal lobe CVA in December 2013 and was started on full strength ASA and Statin.  Currently only on baby aspirin. Statin stopped due to age and goals of care.    ALLERGIES: Review of patient's allergies  indicates no known allergies.     PAST MEDICAL HISTORY:  has a past medical history of Irregular heart beat and Pelvic fracture (H) (11/14/12).     PAST SURGICAL HISTORY:  has a past surgical history that includes GYN surgery (hyst); orthopedic surgery (wrist, hip replacement); and appendectomy.     FAMILY HISTORY: family history is not on file.     SOCIAL HISTORY:  reports that she has never smoked. She does not have any smokeless tobacco history on file. She reports that she does not drink alcohol.    MEDICATIONS:  Current Outpatient Prescriptions   Medication Sig Dispense Refill     Nutritional Supplements (NUTRITIONAL DRINK PO) Take 6 oz by mouth daily (with breakfast)       Nutritional Supplements (ENSURE PLUS) LIQD Take 8 oz by mouth 3 times daily (with meals)       AMOXICILLIN PO Give 2 grams by mouth 1 hour prior to dental appts       senna-docusate (SENOKOT-S;PERICOLACE) 8.6-50 MG per tablet Take 1 tablet by mouth 2 times daily       VITAMIN D, CHOLECALCIFEROL, PO Take 2,000 Units by mouth daily       aspirin 81 MG tablet Take by mouth daily       acetaminophen (TYLENOL) 325 MG tablet Take 650 mg by mouth every 6 hours as needed        multivitamin, therapeutic (THERA-VIT) TABS Take 1 tablet by mouth daily.       Medications reviewed:  Medications reconciled to facility chart and changes were made to reflect current medications as identified as above med list. Below are the changes that were made:   Medications stopped since last EPIC medication reconciliation:   There are no discontinued medications.    Medications started since last [facility] medication reconciliation:  No orders of the defined types were placed in this encounter.    Case Management:  I have reviewed the care plan and MDS - 12/11/17, and do agree with the plan. Patient's desire to return to the community is not assessible due to cognitive impairment.  Information reviewed:  Medications, vital signs, orders, and nursing notes.    ROS:  Limited  "meanginful hx secondary to cognitive impairment, but today pt reports 4 point ROS including Respiratory, CV, GI and , other than that noted in the HPI,  is negative    Exam:  Vitals: /67  Pulse 69  Temp 98.4  F (36.9  C)  Resp 18  Ht 5' 8\" (1.727 m)  Wt 123 lb (55.8 kg)  SpO2 98%  BMI 18.7 kg/m2  BMI= Body mass index is 18.7 kg/(m^2).  GENERAL APPEARANCE:  Alert, in no distress, thin, well dressed w/ jewelry, \"they keep giving it to me so I wear it!\"  EYES: lids normal, sclera clear and conjunctiva normal, no discharge   ENT:  Mouth normal, moist mucous membranes, nose without drainage or crusting, hearing acuity good for age   NECK:  Nontender, supple, symmetrical; no cervical adenopathy, masses or thyromegaly, trachea midline  RESP:  Non-labored breathing, palpation of chest normal, no chest wall tenderness, no respiratory distress, Lung sounds clear, no rales/wheezes/rhonchi, patient is on room air  CV:  Palpation - no murmur/non-displaced PMI, Auscultation - rate and rhythm regualr with occasional early beat, no murmur, no rub or gallop. Apical HR 80.  VASCULAR: No edema bilateral lower extremities.   ABDOMEN:  Flat abd, normal bowel sounds, soft, nontender, no grimacing or guarding with palpation.   M/S:   Gait and station wheelchair bound. Turns in bed independently for exam. Midline spine w/o tenderness. No pain w/ PROM of UE or LE BL.  SKIN:  Inspection - no visible rashes/lesions/uclerations. Palpation- no increased warmth, skin is dry and non-tender.  NEURO: cranial nerves II-XII grossly intact, no facial asymmetry, follows simple commands, no tremor  PSYCH: awake and alert, speech fluent,  insight and judgement impaired, affable - makes joints, cooperative w/ exam       Lab/Diagnostic data:   CBC RESULTS:   Recent Labs   Lab Test  12/07/16   0500  12/20/13   0805  12/19/13   1423   WBC   --   4.8  5.2   RBC   --   3.57*  3.67*   HGB  12.5  12.2  12.5   HCT   --   35.7  37.0   MCV   --   100 "  101*   MCH   --   34.2*  34.1*   MCHC   --   34.2  33.8   RDW   --   13.3  13.3   PLT   --   125*  142*       Last Basic Metabolic Panel:  Recent Labs   Lab Test  12/07/16   0500 09/01/15  12/21/13   0821   NA  141  138  138   POTASSIUM  4.3  4.3  4.1   CHLORIDE  105  104  107   RIK  8.1*  8.1  8.5   CO2  29   --   27   BUN  23  22  16   CR  0.85  0.81  0.69   GLC  77  83  98   Labs: 12/7/16:  crcl=33    Liver Function Studies -   Recent Labs   Lab Test  01/02/10   0740   PROTTOTAL  5.4*   ALBUMIN  2.6*   BILITOTAL  0.7   ALKPHOS  64   AST  23   ALT  7       TSH   Date Value Ref Range Status   12/07/2016 4.89 (H) 0.40 - 4.00 mU/L Final   12/19/2013 2.90 0.4 - 5.0 mU/L Final     Lab Results   Component Value Date    A1C 5.5 12/20/2013     ASSESSMENT/PLAN  (G30.1,  F02.80) Late onset Alzheimer's disease without behavioral disturbance  (primary encounter diagnosis)  Comment: slow progressive decline in function and cognition, mood and behaviors stable  Plan:  - Continue supportive care in LTC environment   - Focus on comfort and QoL    (I50.42) Chronic combined systolic and diastolic congestive heart failure (H)  (I10) Essential hypertension, benign  Comment: compensated, vital signs stable off antihypertensive medications, hx of aflutter, HR controlled off medication  Plan:   - Monitor weights, PRN labs  - Routine VS    (K59.01) Slow transit constipation  Comment: stable, having regular BMs  Plan:   - Continue senna-s one tablet PO BID.    (Z87.81) H/O compression fracture of spine  Comment: No back pain reported, hx of compression fracture  Plan:   - PRN tylenol  - Continue vitamin D    (Z86.73) History of CVA (cerebrovascular accident)  Comment: occurred in 2013, R frontal lobe   Plan:   - Continue ASA 81 mg daily for secondary prevention  - Statin stopped due to age and goals of care    Electronically signed by:  Belkys Fiore, DEAN CNP

## 2018-05-19 NOTE — PROGRESS NOTES
Essie Jernigan is a 89 year old female seen May 9, 2018 at [facility] where she has resided for 6 years (admit 2012) seen for routine visit.     Patient is seen in her room, up to WC and her daughter Vivian Torres is visitng.   Patient is bright and witty, making jokes.   States she feels well.   Denies pain, dyspnea or other symptoms.       Chart reviewed for pertinent medical history: In LTC since fall with pelvic fracture in 2012.   Had frontal lobe CVI in 2013, with resulting cognitive changes, no hospitalization since then.   Has a h/o HTN but not currently needing bp lowering meds; she has a h/o diastolic CHF    PMH:  PAF /Atrial flutter  Chronic diastolic CHF   ECHO 60-65% in 2013, JAMESON, MR, aortic sclerosis  Dementia, late onset  S/p right frontal lobe CVI, 2013  HTN  S/p hip fracture, 2007  S/p pelvic fracture, 2012  BCC, face  OA, s/p PADDY    SH:  , previously lived independently in an apartment.   Daughter, grandchildren live locally.     ROS:  Limited, but negative except for above.    SLUMS 16/30   BIMS 10/15  Wt Readings from Last 5 Encounters:   03/05/18 123 lb (55.8 kg)   01/23/18 122 lb (55.3 kg)   11/22/17 128 lb (58.1 kg)   10/19/17 122 lb 6.4 oz (55.5 kg)   08/03/17 124 lb 3.2 oz (56.3 kg)        EXAM:  Pleasant, NAD  /76  Pulse 77  Temp 97.2  F (36.2  C)  Resp 18  SpO2 94%   +kyphosis  Neck supple without adenopathy  Lungs clear bilaterally with fair air movement  Heart RRR s1s2, 1/6 HSM  Abd soft, NT, no distention, +BS  Ext without edema, WC bound  Psych: affect pleasant  Neuro: non-focal.    No recent labs.      IMP/PLAN:  (I50.42) Chronic combined systolic and diastolic congestive heart failure (H)   Comment: longstanding dx but stable fluid status for >one year.   Plan: follow for symptoms, treat if uncomfortable.       (I48.0) PAF (paroxysmal atrial fibrillation) (H)  Comment: controlled VR without rate-slowing medications.    Plan: daily low dose aspirin    (G30.1,  F02.80)  Late onset Alzheimer's disease without behavioral disturbance  Comment: gradual decline  Plan: LTC support for meds, meals, activity, cares.       (M80.88XG) Osteoporotic compression fracture of spine  Comment: occ pain  Plan: local measures, prn analgesia  Continue vit D     (Z86.73) History of CVA (cerebrovascular accident)  Comment: right frontal lobe  Plan: continue ASA for secondary prevention.     (K59.01) Slow transit constipation  Comment: well-managed  Plan: continue daily Senna, monitor    AD: DNR/DNI, comfort approach     Alejandra Orozco MD

## 2018-07-13 NOTE — PROGRESS NOTES
[place] [facility]    Chief Complaint   Patient presents with     retirement Regulatory       [place] Medical Record Number:  [id number]    HPI:    Essie Jernigan is a 89 year old  (8/9/1920), who is being seen today for an episodic care visit at [facility].      HPI information obtained from: facility chart records, facility staff, patient report and [place] Epic chart review.    Today's concern is:  Late onset Alzheimer's disease without behavioral disturbance  Scored 16/30 in SLUMS in September 2013.  Last BIMS 10/15.  Has resided on LTC unit since 2012 after TCU stay for rehab after pelvic fracture.  Needs assistance with ADLs, mobility, safety, medications, and meals.  No longer ambulatory, but can stand to transfer. No recent falls.  Her mood and behavior have been stable.    Chronic combined systolic and diastolic congestive heart failure (H)  Essential hypertension, benign  Afib  Also hx of aflutter.  Metoprolol stopped due to bradycardia.  Not on any cardiovascular medications.  Last echo 12/19/13 and showed EF 60-65%.  No CP or SOB. Does no think she has leg swelling.  Weight is stable.    BP Readings from Last 3 Encounters:   07/13/18 137/81   05/09/18 108/76   03/05/18 111/67     Pulse Readings from Last 4 Encounters:   07/13/18 89   05/09/18 77   03/05/18 69   01/23/18 88     Wt Readings from Last 5 Encounters:   07/13/18 124 lb 3.2 oz (56.3 kg)   03/05/18 123 lb (55.8 kg)   01/23/18 122 lb (55.3 kg)   11/22/17 128 lb (58.1 kg)   10/19/17 122 lb 6.4 oz (55.5 kg)       Slow transit constipation  Having regular BMs.On senna-s one tablet PO BID.  No abd pain. No N/V.   She is thin, but weight is stable.  She is on nutritional supplements (Ensure, nutritious juice) and multivitamin.    H/O compression fracture of spine  Compression deformities of lower lumbar vertebra L3-L5 and moderate age-in-determinant compression fractures of T8-T11.  No back pain today.  Good bed  mobility, WC bound.   PRN Tylenol in place for mild pain.   On vitamin D supplement.    History of CVA (cerebrovascular accident)  R frontal lobe CVA in December 2013 and was started on full strength ASA and Statin.  Currently only on baby aspirin. Statin stopped due to age and goals of care.    ALLERGIES: Review of patient's allergies indicates no known allergies.     Past Medical, Surgical, Family and Social History reviewed and updated in [facility].    Current Outpatient Prescriptions   Medication Sig Dispense Refill     acetaminophen (TYLENOL) 325 MG tablet Take 650 mg by mouth every 6 hours as needed        AMOXICILLIN PO Give 2 grams by mouth 1 hour prior to dental appts       aspirin 81 MG tablet Take by mouth daily       Nutritional Supplements (ENSURE PLUS) LIQD Take 8 oz by mouth 3 times daily (with meals)       Nutritional Supplements (NUTRITIONAL DRINK PO) Take 6 oz by mouth daily (with breakfast)       senna-docusate (SENOKOT-S;PERICOLACE) 8.6-50 MG per tablet Take 1 tablet by mouth 2 times daily       VITAMIN D, CHOLECALCIFEROL, PO Take 2,000 Units by mouth daily       Medications reviewed:  Medications reconciled to facility chart and changes were made to reflect current medications as identified as above med list. Below are the changes that were made:   Medications stopped since last EPIC medication reconciliation:   There are no discontinued medications.    Medications started since last [facility] medication reconciliation:  No orders of the defined types were placed in this encounter.    Case Management:  I have reviewed the care plan and MDS (6/5/18) and do agree with the plan. Patient's desire to return to the community is not present.  Information reviewed:  Medications, vital signs, orders, and nursing notes.  - BIMS 10/15  - 0/27    REVIEW OF SYSTEMS:  Limited secondary to cognitive impairment but today pt reports no pain or dyspnea.     Physical Exam:  /81  Pulse 89  Temp 96.5  F (35.8  C)   Resp 20  Wt 124 lb 3.2 oz (56.3 kg)  SpO2 95%  BMI 18.88 kg/m2  GENERAL APPEARANCE:  Alert, in no distress, thin, well dressed w/ jewelry.  EYES: lids normal, sclera clear and conjunctiva normal, no discharge, wearing glasses  ENT:  Mouth normal, moist mucous membranes, nose without drainage or crusting, hearing acuity good for age   RESP:  Non-labored breathing, palpation of chest normal, no chest wall tenderness, no respiratory distress, Lung sounds clear, no rales/wheezes/rhonchi, patient is on room air  CV:  Palpation - no murmur/non-displaced PMI, Auscultation - irregular, no murmur, no rub or gallop.  VASCULAR: +2 edema from feet to 1/3 up leg   ABDOMEN:  Flat abd, normal bowel sounds, soft, nontender, no grimacing or guarding with palpation.   M/S:   Gait and station wheelchair bound. Midline spine w/o tenderness. No pain w/ PROM of UE or LE BL.  SKIN:  Inspection - no visible rashes/lesions/uclerations. Palpation- no increased warmth, skin is dry and non-tender.  NEURO: cranial nerves II-XII grossly intact, no facial asymmetry, follows simple commands, no tremor  PSYCH: awake and alert, speech fluent,  insight and judgement impaired, affable - makes jokes, cooperative w/ exam. Thinks she is 89, quite surprised to learn she is 89.    Recent Labs:  CBC RESULTS:   Recent Labs   Lab Test  12/07/16   0500  12/20/13   0805  12/19/13   1423   WBC   --   4.8  5.2   RBC   --   3.57*  3.67*   HGB  12.5  12.2  12.5   HCT   --   35.7  37.0   MCV   --   100  101*   MCH   --   34.2*  34.1*   MCHC   --   34.2  33.8   RDW   --   13.3  13.3   PLT   --   125*  142*       Last Basic Metabolic Panel:  Recent Labs   Lab Test  12/07/16   0500 09/01/15  12/21/13   0821   NA  141  138  138   POTASSIUM  4.3  4.3  4.1   CHLORIDE  105  104  107   RIK  8.1*  8.1  8.5   CO2  29   --   27   BUN  23  22  16   CR  0.85  0.81  0.69   GLC  77  83  98       TSH   Date Value Ref Range Status   12/07/2016 4.89 (H) 0.40 - 4.00 mU/L Final    12/19/2013 2.90 0.4 - 5.0 mU/L Final     Lab Results   Component Value Date    A1C 5.5 12/20/2013     Assessment/Plan:  (G30.1,  F02.80) Late onset Alzheimer's disease without behavioral disturbance  (primary encounter diagnosis)  Comment: Overall slow progressive decline in function and cognition, but stable interval for resident. No change in mood/behavior.  Plan:  - Continue supportive care in LTC environment   - Focus on comfort and QoL    (I50.42) Chronic combined systolic and diastolic congestive heart failure (H)  (I10) Essential hypertension, benign  (I48.0) PAF  Comment: Leg swelling today, but lungs clear no dyspnea, weight stable - possible PVD. Vital signs stable off antihypertensive medications. HR controlled off beta-blocker  Plan:   - Agreeable to tubigrips on q  AM and off q HS  - Staff to assist resident to elevate legs TID  - Monitor weights, PRN labs  - Routine VS    (K59.01) Slow transit constipation  Comment: Having regular BMs  Plan:   - Continue senna-s one tablet PO BID.    (Z87.81) H/O compression fracture of spine  Comment: No back pain reported, hx of compression fracture.  Plan:   - PRN tylenol in place  - Continue vitamin D supplement    (Z86.73) History of CVA (cerebrovascular accident)  Comment: Hx of CVA in 2013, R frontal lobe.   Plan:   - Continue ASA 81 mg daily for secondary prevention  - Statin stopped due to age and goals of care    Electronically signed by  DEAN Herbert CNP

## 2018-07-13 NOTE — LETTER
7/13/2018        RE: Essie Jernigan  [address] [facility] [address]        [place] [facility]    Chief Complaint   Patient presents with     shelter Acute       [place] Medical Record Number:  [id number]    HPI:    Essie Jernigan is a 89 year old  (8/9/1920), who is being seen today for an episodic care visit at [facility].      HPI information obtained from: facility chart records, facility staff, patient report and [facility] chart review.    Today's concern is:  Late onset Alzheimer's disease without behavioral disturbance  Scored 16/30 in SLUMS in September 2013.  Last BIMS 10/15.  Has resided on LTC unit since 2012 after TCU stay for rehab after pelvic fracture.  Needs assistance with ADLs, mobility, safety, medications, and meals.  No longer ambulatory, but can stand to transfer. No recent falls.  Her mood and behavior have been stable.    Chronic combined systolic and diastolic congestive heart failure (H)  Essential hypertension, benign  Afib  Also hx of aflutter.  Metoprolol stopped due to bradycardia.  Not on any cardiovascular medications.  Last echo 12/19/13 and showed EF 60-65%.  No CP or SOB. Does no think she has leg swelling.  Weight is stable.    BP Readings from Last 3 Encounters:   07/13/18 137/81   05/09/18 108/76   03/05/18 111/67     Pulse Readings from Last 4 Encounters:   07/13/18 89   05/09/18 77   03/05/18 69   01/23/18 88     Wt Readings from Last 5 Encounters:   07/13/18 124 lb 3.2 oz (56.3 kg)   03/05/18 123 lb (55.8 kg)   01/23/18 122 lb (55.3 kg)   11/22/17 128 lb (58.1 kg)   10/19/17 122 lb 6.4 oz (55.5 kg)       Slow transit constipation  Having regular BMs.On senna-s one tablet PO BID.  No abd pain. No N/V.   She is thin, but weight is stable.  She is on nutritional supplements (Ensure, nutritious juice) and multivitamin.    H/O compression fracture of spine  Compression deformities of lower lumbar vertebra L3-L5 and moderate  age-in-determinant compression fractures of T8-T11.  No back pain today.  Good bed mobility, WC bound.   PRN Tylenol in place for mild pain.   On vitamin D supplement.    History of CVA (cerebrovascular accident)  R frontal lobe CVA in December 2013 and was started on full strength ASA and Statin.  Currently only on baby aspirin. Statin stopped due to age and goals of care.    ALLERGIES: Review of patient's allergies indicates no known allergies.     Past Medical, Surgical, Family and Social History reviewed and updated in [facility].    Current Outpatient Prescriptions   Medication Sig Dispense Refill     acetaminophen (TYLENOL) 325 MG tablet Take 650 mg by mouth every 6 hours as needed        AMOXICILLIN PO Give 2 grams by mouth 1 hour prior to dental appts       aspirin 81 MG tablet Take by mouth daily       Nutritional Supplements (ENSURE PLUS) LIQD Take 8 oz by mouth 3 times daily (with meals)       Nutritional Supplements (NUTRITIONAL DRINK PO) Take 6 oz by mouth daily (with breakfast)       senna-docusate (SENOKOT-S;PERICOLACE) 8.6-50 MG per tablet Take 1 tablet by mouth 2 times daily       VITAMIN D, CHOLECALCIFEROL, PO Take 2,000 Units by mouth daily       Medications reviewed:  Medications reconciled to facility chart and changes were made to reflect current medications as identified as above med list. Below are the changes that were made:   Medications stopped since last EPIC medication reconciliation:   There are no discontinued medications.    Medications started since last [facility] medication reconciliation:  No orders of the defined types were placed in this encounter.    Case Management:  I have reviewed the care plan and MDS (6/5/18) and do agree with the plan. Patient's desire to return to the community is not present.  Information reviewed:  Medications, vital signs, orders, and nursing notes.  - BIMS 10/15  - 0/27    REVIEW OF SYSTEMS:  Limited secondary to cognitive impairment but today pt reports no pain  or dyspnea.     Physical Exam:  /81  Pulse 89  Temp 96.5  F (35.8  C)  Resp 20  Wt 124 lb 3.2 oz (56.3 kg)  SpO2 95%  BMI 18.88 kg/m2  GENERAL APPEARANCE:  Alert, in no distress, thin, well dressed w/ jewelry.  EYES: lids normal, sclera clear and conjunctiva normal, no discharge, wearing glasses  ENT:  Mouth normal, moist mucous membranes, nose without drainage or crusting, hearing acuity good for age   RESP:  Non-labored breathing, palpation of chest normal, no chest wall tenderness, no respiratory distress, Lung sounds clear, no rales/wheezes/rhonchi, patient is on room air  CV:  Palpation - no murmur/non-displaced PMI, Auscultation - irregular, no murmur, no rub or gallop.  VASCULAR: +2 edema from feet to 1/3 up leg   ABDOMEN:  Flat abd, normal bowel sounds, soft, nontender, no grimacing or guarding with palpation.   M/S:   Gait and station wheelchair bound. Midline spine w/o tenderness. No pain w/ PROM of UE or LE BL.  SKIN:  Inspection - no visible rashes/lesions/uclerations. Palpation- no increased warmth, skin is dry and non-tender.  NEURO: cranial nerves II-XII grossly intact, no facial asymmetry, follows simple commands, no tremor  PSYCH: awake and alert, speech fluent,  insight and judgement impaired, affable - makes jokes, cooperative w/ exam. Thinks she is 89, quite surprised to learn she is 89.    Recent Labs:  CBC RESULTS:   Recent Labs   Lab Test  12/07/16   0500  12/20/13   0805  12/19/13   1423   WBC   --   4.8  5.2   RBC   --   3.57*  3.67*   HGB  12.5  12.2  12.5   HCT   --   35.7  37.0   MCV   --   100  101*   MCH   --   34.2*  34.1*   MCHC   --   34.2  33.8   RDW   --   13.3  13.3   PLT   --   125*  142*       Last Basic Metabolic Panel:  Recent Labs   Lab Test  12/07/16   0500 09/01/15  12/21/13   0821   NA  141  138  138   POTASSIUM  4.3  4.3  4.1   CHLORIDE  105  104  107   RIK  8.1*  8.1  8.5   CO2  29   --   27   BUN  23  22  16   CR  0.85  0.81  0.69   GLC  77  83  98       TSH    Date Value Ref Range Status   12/07/2016 4.89 (H) 0.40 - 4.00 mU/L Final   12/19/2013 2.90 0.4 - 5.0 mU/L Final     Lab Results   Component Value Date    A1C 5.5 12/20/2013     Assessment/Plan:  (G30.1,  F02.80) Late onset Alzheimer's disease without behavioral disturbance  (primary encounter diagnosis)  Comment: Overall slow progressive decline in function and cognition, but stable interval for resident. No change in mood/behavior.  Plan:  - Continue supportive care in LTC environment   - Focus on comfort and QoL    (I50.42) Chronic combined systolic and diastolic congestive heart failure (H)  (I10) Essential hypertension, benign  (I48.0) PAF  Comment: Leg swelling today, but lungs clear no dyspnea, weight stable - possible PVD. Vital signs stable off antihypertensive medications. HR controlled off beta-blocker  Plan:   - Agreeable to tubigrips on q  AM and off q HS  - Staff to assist resident to elevate legs TID  - Monitor weights, PRN labs  - Routine VS    (K59.01) Slow transit constipation  Comment: Having regular BMs  Plan:   - Continue senna-s one tablet PO BID.    (Z87.81) H/O compression fracture of spine  Comment: No back pain reported, hx of compression fracture.  Plan:   - PRN tylenol in place  - Continue vitamin D supplement    (Z86.73) History of CVA (cerebrovascular accident)  Comment: Hx of CVA in 2013, R frontal lobe.   Plan:   - Continue ASA 81 mg daily for secondary prevention  - Statin stopped due to age and goals of care    Electronically signed by  DEAN Purcell CNP                      Sincerely,        DEAN Purcell CNP

## 2018-09-12 NOTE — LETTER
"    9/12/2018        RE: Essie Jernigan  [address] [facility] [address]        Essie Jernigan is a 89 year old female seen September 12, 2018 at [facility] where she has resided for 6 years (admit 2012) seen for routine visit.     Patient is seen in her room, resting abed.    Bright and cheerful; states she feels well.   Denies pain, dyspnea or other symptoms.    No new concerns reported by nursing staff.     Stands to transfer bed to , no recent falls.    By chart review, patient has been in LTC since fall with pelvic fracture in 2012.   Had frontal lobe CVI in 2013, with resulting cognitive changes, no hospitalization since then.   Has a h/o HTN but not currently needing bp lowering meds; she has a h/o diastolic CHF    PMH:  PAF /Atrial flutter  Chronic diastolic CHF   ECHO 60-65% in 2013, JAMESON, MR, aortic sclerosis  Dementia, late onset  S/p right frontal lobe CVI, 2013  HTN  S/p hip fracture, 2007  S/p pelvic fracture, 2012  BCC, face  OA, s/p PADDY    SH:  , previously lived independently in an apartment.   Daughter, grandchildren live locally.     ROS:  Limited, but negative except for above.    SLUMS 16/30   BIMS 10/15  Wt Readings from Last 5 Encounters:   09/12/18 127 lb (57.6 kg)   07/13/18 124 lb 3.2 oz (56.3 kg)   03/05/18 123 lb (55.8 kg)   01/23/18 122 lb (55.3 kg)   11/22/17 128 lb (58.1 kg)        EXAM:  Pleasant, NAD  /83  Pulse 96  Temp 96.1  F (35.6  C)  Resp 19  Ht 5' 8\" (1.727 m)  Wt 127 lb (57.6 kg)  SpO2 93%  BMI 19.31 kg/m2   +kyphosis  Neck supple without adenopathy  Lungs clear bilaterally with fair air movement  Heart RRR s1s2, 1/6 HSM  Abd soft, NT, no distention, +BS  Ext without edema, WC bound  Psych: affect pleasant  Neuro: non-focal.    No recent labs.      IMP/PLAN:  (I50.42) Chronic combined systolic and diastolic congestive heart failure (H)   Comment: longstanding dx but stable fluid status for >one year.   Plan: follow for symptoms, treat if " uncomfortable.       (I48.0) PAF (paroxysmal atrial fibrillation) (H)  Comment: controlled VR without rate-slowing medications.    Plan: daily low dose aspirin    (G30.9,  F01.50,  F02.80) Mixed Alzheimer's and vascular dementia  Comment: gradual decline  Plan: LTC support for meds, meals, activity, cares.       (M80.88XG) Osteoporotic compression fracture of spine  Comment: occ pain  Plan: local measures, prn analgesia  Continue vit D     (Z86.73) History of CVA (cerebrovascular accident)  Comment: right frontal lobe  Plan: continue ASA for secondary prevention.     (K59.01) Slow transit constipation  Comment: well-managed  Plan: continue daily Senna, monitor    AD: DNR/DNI, comfort approach     Alejandra Orozco MD             Sincerely,        Alejandra Orozco MD

## 2018-09-21 NOTE — PROGRESS NOTES
"Essie Jernigan is a 89 year old female seen September 12, 2018 at [facility] where she has resided for 6 years (admit 2012) seen for routine visit.     Patient is seen in her room, resting abed.    Bright and cheerful; states she feels well.   Denies pain, dyspnea or other symptoms.    No new concerns reported by nursing staff.     Stands to transfer bed to , no recent falls.    By chart review, patient has been in LTC since fall with pelvic fracture in 2012.   Had frontal lobe CVI in 2013, with resulting cognitive changes, no hospitalization since then.   Has a h/o HTN but not currently needing bp lowering meds; she has a h/o diastolic CHF    PMH:  PAF /Atrial flutter  Chronic diastolic CHF   ECHO 60-65% in 2013, JAMESON, MR, aortic sclerosis  Dementia, late onset  S/p right frontal lobe CVI, 2013  HTN  S/p hip fracture, 2007  S/p pelvic fracture, 2012  BCC, face  OA, s/p PADDY    SH:  , previously lived independently in an apartment.   Daughter, grandchildren live locally.     ROS:  Limited, but negative except for above.    SLUMS 16/30   BIMS 10/15  Wt Readings from Last 5 Encounters:   09/12/18 127 lb (57.6 kg)   07/13/18 124 lb 3.2 oz (56.3 kg)   03/05/18 123 lb (55.8 kg)   01/23/18 122 lb (55.3 kg)   11/22/17 128 lb (58.1 kg)        EXAM:  Pleasant, NAD  /83  Pulse 96  Temp 96.1  F (35.6  C)  Resp 19  Ht 5' 8\" (1.727 m)  Wt 127 lb (57.6 kg)  SpO2 93%  BMI 19.31 kg/m2   +kyphosis  Neck supple without adenopathy  Lungs clear bilaterally with fair air movement  Heart RRR s1s2, 1/6 HSM  Abd soft, NT, no distention, +BS  Ext 1+ edema, with tubi-  Psych: affect pleasant  Neuro: non-focal.    No recent labs.      IMP/PLAN:  (I50.42) Chronic combined systolic and diastolic congestive heart failure (H)   Comment: some recent increase in LE edema, likely stasis  Plan: follow for symptoms, treat if uncomfortable.  Continue tubi-, elevation as tolerated.          (I48.0) PAF (paroxysmal atrial " fibrillation) (H)  Comment: controlled VR without rate-slowing medications.    Plan: daily low dose aspirin    (G30.9,  F01.50,  F02.80) Mixed Alzheimer's and vascular dementia  Comment: gradual decline  Plan: LTC support for meds, meals, activity, cares.       (M80.88XG) Osteoporotic compression fracture of spine  Comment: occ pain  Plan: local measures, prn analgesia  Continue vit D     (Z86.73) History of CVA (cerebrovascular accident)  Comment: right frontal lobe  Plan: continue ASA for secondary prevention.     (K59.01) Slow transit constipation  Comment: well-managed  Plan: continue daily Senna, monitor    AD: DNR/DNI, comfort approach     Alejandra Orozco MD

## 2018-11-09 NOTE — LETTER
"    11/9/2018        RE: Essie Jernigan  [address] [facility] [address]        [place] [facility]  Chief Complaint   Patient presents with     Annual Comprehensive Nursing Home       HPI:    Essie Jernigan is a 89 year old  (8/9/1920) female with PMH significant for PAF/a.flutter, HFpEF, dementida, hx of CVA (right frontal lobe), OA (s/p PADDY), who is being seen today for an annual comprehensive visit at [facility].  HPI information obtained from: facility chart records, facility staff, patient report and [facility] chart review and daughter, Vivian.    Today's concerns are:  Late onset Alzheimer's disease without behavioral disturbance  Today reports she is \"doing, doing\".   Resident of LTC unit since 2012 after TCU stay for rehab after L superior and inferior pubic rami fracture.  As per usual reports she is 89 years old and says she feels well and \"I don't have too much wrong with me!\"   Scored 16/30 on SLUMS in September 2013.  Last BIMS 12/15.  Needs assistance with ADLs, mobility, safety, medications, and meals.  Her mood and behavior have been stable per staff and family.   Can stand to transfer, but is mostly wheelchair bound.     Chronic combined systolic and diastolic congestive heart failure (H)  Essential hypertension, benign  HFpEF, last echocardiogram 2013 with EF of 60-65% w/ aortic stenosis.  Not on any cardiovascular medications.  No CP or SOB.   Recently had some leg swelling, but abated with compression.  Recent VS reviewed:  BP Readings from Last 3 Encounters:   11/09/18 138/86   09/12/18 133/83   07/13/18 137/81     Slow transit constipation  Having regular BMs.  On senna-s one tablet PO BID.  No abd pain. No N/V.   She is thin, but weight is stable ~ 125#. Body mass index is 19.46 kg/(m^2).  She is on nutritional supplements.    History of CVA (cerebrovascular accident)  Suffered R frontal lobe CVA in December 2013.  Had transient left sided " weakness.  Started on full strength ASA and Statin.  Currently only on baby aspirin.   Statin stopped due to age and goals of care.    Advance Care Planning  POLST last reviewed 12/07/16 - DNR/DNI.  Confirms DNR/DNI and treat reversible illness if meaningful hope of recovery.  Health care agent is her daughter, Vivian Torres    ALLERGIES: No known allergies    PROBLEM LIST:  Patient Active Problem List   Diagnosis     Chronic combined systolic and diastolic congestive heart failure (H)     Constipation     BCC (basal cell carcinoma), face     History of CVA (cerebrovascular accident)     Essential hypertension, benign     Physical deconditioning     H/O compression fracture of spine     Late onset Alzheimer's disease without behavioral disturbance     Advance Care Planning     PAF (paroxysmal atrial fibrillation) (H)     PAST MEDICAL HISTORY:  has a past medical history of Irregular heart beat and Pelvic fracture (H) (11/14/12).   Hx of compression fracture of spine.  PAF /Atrial flutter  Chronic diastolic CHF                        ECHO 60-65% in 2013, JAMESON, MR, aortic sclerosis  Dementia, late onset  S/p right frontal lobe CVI, 2013  HTN  S/p hip fracture, 2007  S/p pelvic fracture, 2012    PAST SURGICAL HISTORY:  has a past surgical history that includes GYN surgery (hyst); orthopedic surgery (wrist, hip replacement); and appendectomy.   S/p PADDY for OA    FAMILY HISTORY: family history is not on file.   One daughter living    SOCIAL HISTORY:  reports that she has never smoked. She does not have any smokeless tobacco history on file. She reports that she does not drink alcohol.   , previously lived independently in an apartment.     Daughter, grandchildren live locally.   Resident of [facility] since 2012.    IMMUNIZATIONS:  Most Recent Immunizations   Administered Date(s) Administered     Influenza (High Dose) 3 valent vaccine 10/23/2017     Influenza (IIV3) PF 10/06/2018     Pneumo Conj 13-V (2010&after)  11/04/2015     Pneumococcal 23 valent 09/12/2013     Tdap (Adacel,Boostrix) 11/09/2015     Above immunizations pulled from [facility]. MIIC and facility records also reconciled. Outstanding information sent to  to update [facility].  Future immunizations are not needed at this point as all recommended immunizations are up to date.   MEDICATIONS:  Current Outpatient Prescriptions   Medication Sig Dispense Refill     acetaminophen (TYLENOL) 325 MG tablet Take 650 mg by mouth every 6 hours as needed        AMOXICILLIN PO Give 2 grams by mouth 1 hour prior to dental appts       aspirin 81 MG tablet Take by mouth daily       Nutritional Supplements (ENSURE PLUS) LIQD Take 8 oz by mouth 3 times daily (with meals)       Nutritional Supplements (NUTRITIONAL DRINK PO) Take 6 oz by mouth daily (with breakfast)       senna-docusate (SENOKOT-S;PERICOLACE) 8.6-50 MG per tablet Take 1 tablet by mouth 2 times daily       VITAMIN D, CHOLECALCIFEROL, PO Take 2,000 Units by mouth daily       Medications reviewed:  Medications reconciled to facility chart and changes were made to reflect current medications as identified as above med list. Below are the changes that were made:   Medications stopped since last EPIC medication reconciliation:   There are no discontinued medications.    Medications started since last [facility] medication reconciliation:  No orders of the defined types were placed in this encounter.    Case Management:  I have reviewed the facility/SNF care plan/MDS which was done 8/29/18, including the falls risk, nutrition and pain screening. I also reviewed the current immunizations, and preventive care..Future cancer screening is not clinically indicated secondary to age/goals of care Patient's desire to return to the community is not assessible due to cognitive impairment. Current Level of Care is appropriate.    BIMS 12/15    PHQ-9 0/27    Advance Directive Discussion:    I reviewed the current  advanced directives as reflected in EPIC, the POLST and the facility chart, and verified the congruency of orders. I contacted the first party daughter (Vivian) and discussed the plan of Care.  I did not due to cognitive impairment review the advance directives with the resident.     Team Discussion:  I communicated with the appropriate disciplines involved with the Plan of Care:   Nursing.    Patient Goal:  Patient's goal is unobtainable secondary to cognitive impairment and family's/responsible party's goal for the patient is focus on comfort and QoL.    Information reviewed:  Medications, vital signs, orders, and nursing notes.    ROS:  Limited meanginful hx secondary to cognitive impairment, but today pt reports 4 point ROS including Respiratory, CV, GI and , other than that noted in the HPI,  is negative    Exam:  /86  Pulse 92  Temp 97.2  F (36.2  C)  Resp 16  Wt 128 lb (58.1 kg)  SpO2 97%  BMI 19.46 kg/m2  GENERAL APPEARANCE:  Alert, in no distress, thin, well dressed w/ jewelry, up in wheelchair today  EYES: lids normal, sclera clear and conjunctiva normal, no discharge   ENT:  Mouth normal, poor dentition, moist mucous membranes, nose without drainage or crusting, hearing acuity good for age   NECK:  Nontender, supple, symmetrical; no cervical adenopathy, masses or thyromegaly, trachea midline  RESP:  Non-labored breathing, palpation of chest normal, no chest wall tenderness, no respiratory distress, Lung sounds clear but slightly diminished t/o, no rales/wheezes/rhonchi, patient is on room air  CV:  Palpation - no murmur/non-displaced PMI, Auscultation - rate and rhythm regular II/VI systolic murmur.  VASCULAR: Trace edema bilateral lower extremities at ankle.  ABDOMEN:  Flat abd, normal bowel sounds, soft, nontender, no grimacing or guarding with palpation. No hepatosplenomegaly. No appreciable masses.  M/S:   Gait and station wheelchair bound. Knees without tenderness to palpation or  swelling.  SKIN:  Inspection - no visible rashes/lesions/uclerations. Palpation- no increased warmth, skin is dry and non-tender.  NEURO: cranial nerves II-XII grossly intact, no facial asymmetry, follows simple commands, moves all extremities symmetrically, normal tone, no tremor  PSYCH: awake and alert, speech fluent,  insight and judgement impaired, affable - makes jokes, cooperative w/ exam     BP Readings from Last 3 Encounters:   11/09/18 138/86   09/12/18 133/83   07/13/18 137/81     Pulse Readings from Last 4 Encounters:   11/09/18 92   09/12/18 96   07/13/18 89   05/09/18 77     Wt Readings from Last 5 Encounters:   11/09/18 128 lb (58.1 kg)   09/12/18 127 lb (57.6 kg)   07/13/18 124 lb 3.2 oz (56.3 kg)   03/05/18 123 lb (55.8 kg)   01/23/18 122 lb (55.3 kg)     Lab/Diagnostic data:    CBC RESULTS:   Recent Labs   Lab Test  12/07/16   0500  12/20/13   0805  12/19/13   1423   WBC   --   4.8  5.2   RBC   --   3.57*  3.67*   HGB  12.5  12.2  12.5   HCT   --   35.7  37.0   MCV   --   100  101*   MCH   --   34.2*  34.1*   MCHC   --   34.2  33.8   RDW   --   13.3  13.3   PLT   --   125*  142*     Last Basic Metabolic Panel:  Recent Labs   Lab Test  12/07/16   0500 09/01/15  12/21/13   0821   NA  141  138  138   POTASSIUM  4.3  4.3  4.1   CHLORIDE  105  104  107   RIK  8.1*  8.1  8.5   CO2  29   --   27   BUN  23  22  16   CR  0.85  0.81  0.69   GLC  77  83  98     TSH   Date Value Ref Range Status   12/07/2016 4.89 (H) 0.40 - 4.00 mU/L Final   12/19/2013 2.90 0.4 - 5.0 mU/L Final     Lab Results   Component Value Date    A1C 5.5 12/20/2013       ASSESSMENT/PLAN  (G30.1,  F02.80) Late onset Alzheimer's disease without behavioral disturbance  (primary encounter diagnosis)  Comment: Slow progressive decline in function and cognition, mood and behavior stable  Plan:  - Continue supportive care in LTC environment   - Focus on comfort and QoL  - Discussed routine labs, would not want unless help with prognosis or to  make resident more comfortable.    (I50.42) Chronic combined systolic and diastolic congestive heart failure (H)  (I10) Essential hypertension, benign  Comment: Stable off antihypertensive medications, intermitent leg swelling but no other signs of fluid volume overload.  Based on JNC-8 goals,  patients age of 89 year old, no presence of diabetes or CKD, and goals of care goal BP is <150/90 mm Hg. patient is stable and continue without pharmacological invention with routine assessment.  Plan:   - Monitor weights, periodic labs, clinical status, and VS  - Continue ASA 81 mg daily     (K59.01) Slow transit constipation  Comment: stable, having regular BMs  Plan:   - Continue senna-s one tablet PO BID, hold for loose stool.    (Z86.73) History of CVA (cerebrovascular accident)  Comment: Occurred in 2013, R frontal lobe   Plan:   - Continue ASA 81 mg daily for secondary prevention  - Statin stopped due to age and goals of care  - If downturn due to suspected CVA daughter would want comfort care    (Z71.89) Advance Care Planning  Comment: goal of care is comfort and QoL given advanced age  Plan:   - Called daughter Vivian to review POC, no change to POLST, confirms focus on comfort and QoL. Would consider IVF, feeding tube, and abx on case by case basis.  - See updated POLST, facility to fax to [facility] for scan into Epic.    Electronically signed by:  DEAN Purcell CNP        Sincerely,        DEAN Purcell CNP

## 2018-11-09 NOTE — PROGRESS NOTES
"[place] [facility]  Chief Complaint   Patient presents with     Annual Comprehensive Nursing Home       HPI:    Essie Jernigan is a 89 year old  (8/9/1920) female with PMH significant for PAF/a.flutter, HFpEF, dementida, hx of CVA (right frontal lobe), OA (s/p PADDY), who is being seen today for an annual comprehensive visit at [facility].  HPI information obtained from: facility chart records, facility staff, patient report and [facility] chart review and daughter, Vivian.    Today's concerns are:  Late onset Alzheimer's disease without behavioral disturbance  Today reports she is \"doing, doing\".   Resident of LTC unit since 2012 after TCU stay for rehab after L superior and inferior pubic rami fracture.  As per usual reports she is 89 years old and says she feels well and \"I don't have too much wrong with me!\"   Scored 16/30 on SLUMS in September 2013.  Last BIMS 12/15.  Needs assistance with ADLs, mobility, safety, medications, and meals.  Her mood and behavior have been stable per staff and family.   Can stand to transfer, but is mostly wheelchair bound.     Chronic combined systolic and diastolic congestive heart failure (H)  Essential hypertension, benign  HFpEF, last echocardiogram 2013 with EF of 60-65% w/ aortic stenosis.  Not on any cardiovascular medications.  No CP or SOB.   Recently had some leg swelling, but abated with compression.  Recent VS reviewed:  BP Readings from Last 3 Encounters:   11/09/18 138/86   09/12/18 133/83   07/13/18 137/81     Slow transit constipation  Having regular BMs.  On senna-s one tablet PO BID.  No abd pain. No N/V.   She is thin, but weight is stable ~ 125#. Body mass index is 19.46 kg/(m^2).  She is on nutritional supplements.    History of CVA (cerebrovascular accident)  Suffered R frontal lobe CVA in December 2013.  Had transient left sided weakness.  Started on full strength ASA and Statin.  Currently only on baby aspirin.   Statin stopped " due to age and goals of care.    Advance Care Planning  POLST last reviewed 12/07/16 - DNR/DNI.  Confirms DNR/DNI and treat reversible illness if meaningful hope of recovery.  Health care agent is her daughter, Vivian Torres    ALLERGIES: No known allergies    PROBLEM LIST:  Patient Active Problem List   Diagnosis     Chronic combined systolic and diastolic congestive heart failure (H)     Constipation     BCC (basal cell carcinoma), face     History of CVA (cerebrovascular accident)     Essential hypertension, benign     Physical deconditioning     H/O compression fracture of spine     Late onset Alzheimer's disease without behavioral disturbance     Advance Care Planning     PAF (paroxysmal atrial fibrillation) (H)     PAST MEDICAL HISTORY:  has a past medical history of Irregular heart beat and Pelvic fracture (H) (11/14/12).   Hx of compression fracture of spine.  PAF /Atrial flutter  Chronic diastolic CHF                        ECHO 60-65% in 2013, JAMESON, MR, aortic sclerosis  Dementia, late onset  S/p right frontal lobe CVI, 2013  HTN  S/p hip fracture, 2007  S/p pelvic fracture, 2012    PAST SURGICAL HISTORY:  has a past surgical history that includes GYN surgery (hyst); orthopedic surgery (wrist, hip replacement); and appendectomy.   S/p PADDY for OA    FAMILY HISTORY: family history is not on file.   One daughter living    SOCIAL HISTORY:  reports that she has never smoked. She does not have any smokeless tobacco history on file. She reports that she does not drink alcohol.   , previously lived independently in an apartment.     Daughter, grandchildren live locally.   [place] [facility] [place].    IMMUNIZATIONS:  Most Recent Immunizations   Administered Date(s) Administered     Influenza (High Dose) 3 valent vaccine 10/23/2017     Influenza (IIV3) PF 10/06/2018     Pneumo Conj 13-V (2010&after) 11/04/2015     Pneumococcal 23 valent 09/12/2013     Tdap (Adacel,Boostrix) 11/09/2015     Above immunizations  pulled from [facility]. MIIC and facility records also reconciled. Outstanding information sent to  to update [facility].  Future immunizations are not needed at this point as all recommended immunizations are up to date.   MEDICATIONS:  Current Outpatient Prescriptions   Medication Sig Dispense Refill     acetaminophen (TYLENOL) 325 MG tablet Take 650 mg by mouth every 6 hours as needed        AMOXICILLIN PO Give 2 grams by mouth 1 hour prior to dental appts       aspirin 81 MG tablet Take by mouth daily       Nutritional Supplements (ENSURE PLUS) LIQD Take 8 oz by mouth 3 times daily (with meals)       Nutritional Supplements (NUTRITIONAL DRINK PO) Take 6 oz by mouth daily (with breakfast)       senna-docusate (SENOKOT-S;PERICOLACE) 8.6-50 MG per tablet Take 1 tablet by mouth 2 times daily       VITAMIN D, CHOLECALCIFEROL, PO Take 2,000 Units by mouth daily       Medications reviewed:  Medications reconciled to facility chart and changes were made to reflect current medications as identified as above med list. Below are the changes that were made:   Medications stopped since last EPIC medication reconciliation:   There are no discontinued medications.    Medications started since last [facility] medication reconciliation:  No orders of the defined types were placed in this encounter.    Case Management:  I have reviewed the facility/SNF care plan/MDS which was done 8/29/18, including the falls risk, nutrition and pain screening. I also reviewed the current immunizations, and preventive care..Future cancer screening is not clinically indicated secondary to age/goals of care Patient's desire to return to the community is not assessible due to cognitive impairment. Current Level of Care is appropriate.    BIMS 12/15    PHQ-9 0/27    Advance Directive Discussion:    I reviewed the current advanced directives as reflected in EPIC, the POLST and the facility chart, and verified the congruency of orders.  I contacted the first party daughter (Vivian) and discussed the plan of Care.  I did not due to cognitive impairment review the advance directives with the resident.     Team Discussion:  I communicated with the appropriate disciplines involved with the Plan of Care:   Nursing.    Patient Goal:  Patient's goal is unobtainable secondary to cognitive impairment and family's/responsible party's goal for the patient is focus on comfort and QoL.    Information reviewed:  Medications, vital signs, orders, and nursing notes.    ROS:  Limited meanginful hx secondary to cognitive impairment, but today pt reports 4 point ROS including Respiratory, CV, GI and , other than that noted in the HPI,  is negative    Exam:  /86  Pulse 92  Temp 97.2  F (36.2  C)  Resp 16  Wt 128 lb (58.1 kg)  SpO2 97%  BMI 19.46 kg/m2  GENERAL APPEARANCE:  Alert, in no distress, thin, well dressed w/ jewelry, up in wheelchair today  EYES: lids normal, sclera clear and conjunctiva normal, no discharge   ENT:  Mouth normal, poor dentition, moist mucous membranes, nose without drainage or crusting, hearing acuity good for age   NECK:  Nontender, supple, symmetrical; no cervical adenopathy, masses or thyromegaly, trachea midline  RESP:  Non-labored breathing, palpation of chest normal, no chest wall tenderness, no respiratory distress, Lung sounds clear but slightly diminished t/o, no rales/wheezes/rhonchi, patient is on room air  CV:  Palpation - no murmur/non-displaced PMI, Auscultation - rate and rhythm regular II/VI systolic murmur.  VASCULAR: Trace edema bilateral lower extremities at ankle.  ABDOMEN:  Flat abd, normal bowel sounds, soft, nontender, no grimacing or guarding with palpation. No hepatosplenomegaly. No appreciable masses.  M/S:   Gait and station wheelchair bound. Knees without tenderness to palpation or swelling.  SKIN:  Inspection - no visible rashes/lesions/uclerations. Palpation- no increased warmth, skin is dry and  non-tender.  NEURO: cranial nerves II-XII grossly intact, no facial asymmetry, follows simple commands, moves all extremities symmetrically, normal tone, no tremor  PSYCH: awake and alert, speech fluent,  insight and judgement impaired, affable - makes jokes, cooperative w/ exam     BP Readings from Last 3 Encounters:   11/09/18 138/86   09/12/18 133/83   07/13/18 137/81     Pulse Readings from Last 4 Encounters:   11/09/18 92   09/12/18 96   07/13/18 89   05/09/18 77     Wt Readings from Last 5 Encounters:   11/09/18 128 lb (58.1 kg)   09/12/18 127 lb (57.6 kg)   07/13/18 124 lb 3.2 oz (56.3 kg)   03/05/18 123 lb (55.8 kg)   01/23/18 122 lb (55.3 kg)     Lab/Diagnostic data:    CBC RESULTS:   Recent Labs   Lab Test  12/07/16   0500  12/20/13   0805  12/19/13   1423   WBC   --   4.8  5.2   RBC   --   3.57*  3.67*   HGB  12.5  12.2  12.5   HCT   --   35.7  37.0   MCV   --   100  101*   MCH   --   34.2*  34.1*   MCHC   --   34.2  33.8   RDW   --   13.3  13.3   PLT   --   125*  142*     Last Basic Metabolic Panel:  Recent Labs   Lab Test  12/07/16   0500 09/01/15  12/21/13   0821   NA  141  138  138   POTASSIUM  4.3  4.3  4.1   CHLORIDE  105  104  107   RIK  8.1*  8.1  8.5   CO2  29   --   27   BUN  23  22  16   CR  0.85  0.81  0.69   GLC  77  83  98     TSH   Date Value Ref Range Status   12/07/2016 4.89 (H) 0.40 - 4.00 mU/L Final   12/19/2013 2.90 0.4 - 5.0 mU/L Final     Lab Results   Component Value Date    A1C 5.5 12/20/2013       ASSESSMENT/PLAN  (G30.1,  F02.80) Late onset Alzheimer's disease without behavioral disturbance  (primary encounter diagnosis)  Comment: Slow progressive decline in function and cognition, mood and behavior stable  Plan:  - Continue supportive care in LTC environment   - Focus on comfort and QoL  - Discussed routine labs, would not want unless help with prognosis or to make resident more comfortable.    (I50.42) Chronic combined systolic and diastolic congestive heart failure (H)  (I10)  Essential hypertension, benign  Comment: Stable off antihypertensive medications, intermitent leg swelling but no other signs of fluid volume overload.  Based on JNC-8 goals,  patients age of 89 year old, no presence of diabetes or CKD, and goals of care goal BP is <150/90 mm Hg. patient is stable and continue without pharmacological invention with routine assessment.  Plan:   - Monitor weights, periodic labs, clinical status, and VS  - Continue ASA 81 mg daily     (K59.01) Slow transit constipation  Comment: stable, having regular BMs  Plan:   - Continue senna-s one tablet PO BID, hold for loose stool.    (Z86.73) History of CVA (cerebrovascular accident)  Comment: Occurred in 2013, R frontal lobe   Plan:   - Continue ASA 81 mg daily for secondary prevention  - Statin stopped due to age and goals of care  - If downturn due to suspected CVA daughter would want comfort care    (Z71.89) Advance Care Planning  Comment: goal of care is comfort and QoL given advanced age  Plan:   - Called daughter Vivian to review POC, no change to POLST, confirms focus on comfort and QoL. Would consider IVF, feeding tube, and abx on case by case basis.  - See updated POLST, facility to fax to [facility] for scan into Epic.    Electronically signed by:  DEAN Purcell CNP

## 2019-01-01 ENCOUNTER — APPOINTMENT (OUTPATIENT)
Dept: PHYSICAL THERAPY | Facility: CLINIC | Age: 84
DRG: 871 | End: 2019-01-01
Attending: INTERNAL MEDICINE
Payer: COMMERCIAL

## 2019-01-01 ENCOUNTER — TRANSFERRED RECORDS (OUTPATIENT)
Dept: HEALTH INFORMATION MANAGEMENT | Facility: CLINIC | Age: 84
End: 2019-01-01

## 2019-01-01 ENCOUNTER — TELEPHONE (OUTPATIENT)
Dept: GERIATRICS | Facility: CLINIC | Age: 84
End: 2019-01-01

## 2019-01-01 ENCOUNTER — NURSING HOME VISIT (OUTPATIENT)
Dept: GERIATRICS | Facility: CLINIC | Age: 84
End: 2019-01-01
Payer: COMMERCIAL

## 2019-01-01 ENCOUNTER — DOCUMENTATION ONLY (OUTPATIENT)
Dept: OTHER | Facility: CLINIC | Age: 84
End: 2019-01-01

## 2019-01-01 ENCOUNTER — APPOINTMENT (OUTPATIENT)
Dept: SPEECH THERAPY | Facility: CLINIC | Age: 84
DRG: 871 | End: 2019-01-01
Attending: INTERNAL MEDICINE
Payer: COMMERCIAL

## 2019-01-01 ENCOUNTER — HOSPITAL LABORATORY (OUTPATIENT)
Dept: OTHER | Facility: CLINIC | Age: 84
End: 2019-01-01

## 2019-01-01 ENCOUNTER — AMBULATORY - HEALTHEAST (OUTPATIENT)
Dept: OTHER | Facility: CLINIC | Age: 84
End: 2019-01-01

## 2019-01-01 ENCOUNTER — HOSPITAL ENCOUNTER (INPATIENT)
Facility: CLINIC | Age: 84
LOS: 3 days | Discharge: NURSING, REHAB OR LONG-TERM CARE | DRG: 871 | End: 2019-03-14
Attending: EMERGENCY MEDICINE | Admitting: HOSPITALIST
Payer: COMMERCIAL

## 2019-01-01 ENCOUNTER — CLINICAL UPDATE (OUTPATIENT)
Dept: PHARMACY | Facility: CLINIC | Age: 84
End: 2019-01-01

## 2019-01-01 ENCOUNTER — APPOINTMENT (OUTPATIENT)
Dept: GENERAL RADIOLOGY | Facility: CLINIC | Age: 84
DRG: 871 | End: 2019-01-01
Attending: EMERGENCY MEDICINE
Payer: COMMERCIAL

## 2019-01-01 VITALS
RESPIRATION RATE: 32 BRPM | BODY MASS INDEX: 19.2 KG/M2 | SYSTOLIC BLOOD PRESSURE: 109 MMHG | HEART RATE: 121 BPM | TEMPERATURE: 98.2 F | WEIGHT: 126.7 LBS | DIASTOLIC BLOOD PRESSURE: 69 MMHG | OXYGEN SATURATION: 92 % | HEIGHT: 68 IN

## 2019-01-01 VITALS
SYSTOLIC BLOOD PRESSURE: 119 MMHG | TEMPERATURE: 96.6 F | WEIGHT: 128.6 LBS | HEART RATE: 85 BPM | BODY MASS INDEX: 19.49 KG/M2 | RESPIRATION RATE: 18 BRPM | DIASTOLIC BLOOD PRESSURE: 74 MMHG | OXYGEN SATURATION: 93 % | HEIGHT: 68 IN

## 2019-01-01 VITALS
BODY MASS INDEX: 21.11 KG/M2 | TEMPERATURE: 97.9 F | RESPIRATION RATE: 26 BRPM | WEIGHT: 126.7 LBS | SYSTOLIC BLOOD PRESSURE: 90 MMHG | OXYGEN SATURATION: 93 % | HEIGHT: 65 IN | HEART RATE: 113 BPM | DIASTOLIC BLOOD PRESSURE: 54 MMHG

## 2019-01-01 VITALS
WEIGHT: 126.7 LBS | HEIGHT: 68 IN | HEART RATE: 92 BPM | TEMPERATURE: 98.9 F | BODY MASS INDEX: 19.2 KG/M2 | DIASTOLIC BLOOD PRESSURE: 74 MMHG | OXYGEN SATURATION: 92 % | RESPIRATION RATE: 18 BRPM | SYSTOLIC BLOOD PRESSURE: 115 MMHG

## 2019-01-01 VITALS
OXYGEN SATURATION: 94 % | DIASTOLIC BLOOD PRESSURE: 68 MMHG | WEIGHT: 135.36 LBS | SYSTOLIC BLOOD PRESSURE: 109 MMHG | HEART RATE: 106 BPM | RESPIRATION RATE: 20 BRPM | HEIGHT: 65 IN | BODY MASS INDEX: 22.55 KG/M2 | TEMPERATURE: 98 F

## 2019-01-01 VITALS
DIASTOLIC BLOOD PRESSURE: 76 MMHG | WEIGHT: 129.8 LBS | HEART RATE: 78 BPM | SYSTOLIC BLOOD PRESSURE: 124 MMHG | OXYGEN SATURATION: 95 % | TEMPERATURE: 97.2 F | BODY MASS INDEX: 19.74 KG/M2 | RESPIRATION RATE: 18 BRPM

## 2019-01-01 VITALS
HEART RATE: 90 BPM | HEIGHT: 68 IN | OXYGEN SATURATION: 97 % | BODY MASS INDEX: 19.31 KG/M2 | TEMPERATURE: 96.6 F | SYSTOLIC BLOOD PRESSURE: 125 MMHG | RESPIRATION RATE: 18 BRPM | DIASTOLIC BLOOD PRESSURE: 73 MMHG | WEIGHT: 127.4 LBS

## 2019-01-01 DIAGNOSIS — F02.80 MIXED ALZHEIMER'S AND VASCULAR DEMENTIA (H): ICD-10-CM

## 2019-01-01 DIAGNOSIS — J20.5 RSV BRONCHITIS: ICD-10-CM

## 2019-01-01 DIAGNOSIS — G30.1 LATE ONSET ALZHEIMER'S DISEASE WITHOUT BEHAVIORAL DISTURBANCE (H): Primary | ICD-10-CM

## 2019-01-01 DIAGNOSIS — I10 ESSENTIAL HYPERTENSION, BENIGN: ICD-10-CM

## 2019-01-01 DIAGNOSIS — G30.1 LATE ONSET ALZHEIMER'S DISEASE WITHOUT BEHAVIORAL DISTURBANCE (H): ICD-10-CM

## 2019-01-01 DIAGNOSIS — I50.42 CHRONIC COMBINED SYSTOLIC AND DIASTOLIC CONGESTIVE HEART FAILURE (H): ICD-10-CM

## 2019-01-01 DIAGNOSIS — I48.0 PAF (PAROXYSMAL ATRIAL FIBRILLATION) (H): ICD-10-CM

## 2019-01-01 DIAGNOSIS — I48.91 ATRIAL FIBRILLATION WITH RVR (H): ICD-10-CM

## 2019-01-01 DIAGNOSIS — R65.20 SEVERE SEPSIS (H): ICD-10-CM

## 2019-01-01 DIAGNOSIS — F02.80 LATE ONSET ALZHEIMER'S DISEASE WITHOUT BEHAVIORAL DISTURBANCE (H): ICD-10-CM

## 2019-01-01 DIAGNOSIS — J18.9 PNEUMONIA OF RIGHT LOWER LOBE DUE TO INFECTIOUS ORGANISM: Primary | ICD-10-CM

## 2019-01-01 DIAGNOSIS — R13.10 DYSPHAGIA, UNSPECIFIED TYPE: ICD-10-CM

## 2019-01-01 DIAGNOSIS — F02.80 LATE ONSET ALZHEIMER'S DISEASE WITHOUT BEHAVIORAL DISTURBANCE (H): Primary | ICD-10-CM

## 2019-01-01 DIAGNOSIS — Z87.81 H/O COMPRESSION FRACTURE OF SPINE: ICD-10-CM

## 2019-01-01 DIAGNOSIS — I50.30 (HFPEF) HEART FAILURE WITH PRESERVED EJECTION FRACTION (H): ICD-10-CM

## 2019-01-01 DIAGNOSIS — R00.0 TACHYCARDIA: ICD-10-CM

## 2019-01-01 DIAGNOSIS — A41.9 SEVERE SEPSIS (H): ICD-10-CM

## 2019-01-01 DIAGNOSIS — J20.5 ACUTE BRONCHITIS DUE TO RESPIRATORY SYNCYTIAL VIRUS (RSV): Primary | ICD-10-CM

## 2019-01-01 DIAGNOSIS — Z86.73 HISTORY OF CVA (CEREBROVASCULAR ACCIDENT): ICD-10-CM

## 2019-01-01 DIAGNOSIS — F01.50 MIXED ALZHEIMER'S AND VASCULAR DEMENTIA (H): ICD-10-CM

## 2019-01-01 DIAGNOSIS — J18.9 PNEUMONIA DUE TO INFECTIOUS ORGANISM, UNSPECIFIED LATERALITY, UNSPECIFIED PART OF LUNG: ICD-10-CM

## 2019-01-01 DIAGNOSIS — N18.30 CHRONIC KIDNEY DISEASE, STAGE III (MODERATE) (H): ICD-10-CM

## 2019-01-01 DIAGNOSIS — K59.01 SLOW TRANSIT CONSTIPATION: ICD-10-CM

## 2019-01-01 DIAGNOSIS — I87.303 STASIS EDEMA OF BOTH LOWER EXTREMITIES: Primary | ICD-10-CM

## 2019-01-01 DIAGNOSIS — G30.9 MIXED ALZHEIMER'S AND VASCULAR DEMENTIA (H): ICD-10-CM

## 2019-01-01 DIAGNOSIS — R53.1 WEAKNESS: Primary | ICD-10-CM

## 2019-01-01 DIAGNOSIS — J18.9 PNEUMONIA OF RIGHT LOWER LOBE DUE TO INFECTIOUS ORGANISM: ICD-10-CM

## 2019-01-01 LAB
ALBUMIN SERPL-MCNC: 2.2 G/DL (ref 3.4–5)
ALBUMIN SERPL-MCNC: 2.6 G/DL (ref 3.4–5)
ALBUMIN SERPL-MCNC: 3 G/DL (ref 3.5–5)
ALBUMIN UR-MCNC: 30 MG/DL
ALP SERPL-CCNC: 61 U/L (ref 45–120)
ALP SERPL-CCNC: 74 U/L (ref 40–150)
ALP SERPL-CCNC: 87 U/L (ref 40–150)
ALT SERPL W P-5'-P-CCNC: 15 U/L (ref 0–50)
ALT SERPL W P-5'-P-CCNC: 15 U/L (ref 0–50)
ALT SERPL-CCNC: <9 U/L (ref 0–45)
ANION GAP SERPL CALCULATED.3IONS-SCNC: 3 MMOL/L (ref 3–14)
ANION GAP SERPL CALCULATED.3IONS-SCNC: 6 MMOL/L (ref 3–14)
ANION GAP SERPL CALCULATED.3IONS-SCNC: 6 MMOL/L (ref 3–14)
ANION GAP SERPL CALCULATED.3IONS-SCNC: 7 MMOL/L (ref 3–14)
ANION GAP SERPL CALCULATED.3IONS-SCNC: 8 MMOL/L (ref 3–14)
ANION GAP SERPL CALCULATED.3IONS-SCNC: 8 MMOL/L (ref 3–14)
ANION GAP SERPL CALCULATED.3IONS-SCNC: 9 MMOL/L (ref 3–14)
ANION GAP SERPL CALCULATED.3IONS-SCNC: 9 MMOL/L (ref 5–18)
APPEARANCE UR: CLEAR
AST SERPL W P-5'-P-CCNC: 25 U/L (ref 0–45)
AST SERPL W P-5'-P-CCNC: 29 U/L (ref 0–45)
AST SERPL-CCNC: 13 U/L (ref 0–40)
BACTERIA SPEC CULT: NO GROWTH
BASOPHILS # BLD AUTO: 0 10E9/L (ref 0–0.2)
BASOPHILS # BLD AUTO: 0 10E9/L (ref 0–0.2)
BASOPHILS NFR BLD AUTO: 0.1 %
BASOPHILS NFR BLD AUTO: 0.2 %
BILIRUB SERPL-MCNC: 0.6 MG/DL (ref 0–1)
BILIRUB SERPL-MCNC: 0.8 MG/DL (ref 0.2–1.3)
BILIRUB SERPL-MCNC: 1.5 MG/DL (ref 0.2–1.3)
BILIRUB UR QL STRIP: NEGATIVE
BUN SERPL-MCNC: 18 MG/DL (ref 7–30)
BUN SERPL-MCNC: 21 MG/DL (ref 7–30)
BUN SERPL-MCNC: 25 MG/DL (ref 7–30)
BUN SERPL-MCNC: 30 MG/DL (ref 8–22)
BUN SERPL-MCNC: 33 MG/DL (ref 7–30)
BUN SERPL-MCNC: 37 MG/DL (ref 7–30)
BUN SERPL-MCNC: 38 MG/DL (ref 7–30)
BUN SERPL-MCNC: 43 MG/DL (ref 7–30)
BUN SERPL-MCNC: 58 MG/DL (ref 7–30)
BUN SERPL-MCNC: 80 MG/DL (ref 7–30)
CALCIUM SERPL-MCNC: 7.5 MG/DL (ref 8.5–10.1)
CALCIUM SERPL-MCNC: 8.1 MG/DL (ref 8.5–10.1)
CALCIUM SERPL-MCNC: 8.2 MG/DL (ref 8.5–10.1)
CALCIUM SERPL-MCNC: 8.4 MG/DL (ref 8.5–10.1)
CALCIUM SERPL-MCNC: 8.5 MG/DL (ref 8.5–10.1)
CALCIUM SERPL-MCNC: 8.7 MG/DL (ref 8.5–10.1)
CALCIUM SERPL-MCNC: 8.7 MG/DL (ref 8.5–10.5)
CHLORIDE SERPL-SCNC: 102 MMOL/L (ref 94–109)
CHLORIDE SERPL-SCNC: 105 MMOL/L (ref 94–109)
CHLORIDE SERPL-SCNC: 105 MMOL/L (ref 94–109)
CHLORIDE SERPL-SCNC: 106 MMOL/L (ref 94–109)
CHLORIDE SERPL-SCNC: 107 MMOL/L (ref 94–109)
CHLORIDE SERPL-SCNC: 109 MMOL/L (ref 94–109)
CHLORIDE SERPL-SCNC: 110 MMOL/L (ref 94–109)
CHLORIDE SERPL-SCNC: 111 MMOL/L (ref 94–109)
CHLORIDE SERPL-SCNC: 116 MMOL/L (ref 94–109)
CHLORIDE SERPLBLD-SCNC: 106 MMOL/L (ref 98–107)
CO2 SERPL-SCNC: 25 MMOL/L (ref 20–32)
CO2 SERPL-SCNC: 25 MMOL/L (ref 22–31)
CO2 SERPL-SCNC: 26 MMOL/L (ref 20–32)
CO2 SERPL-SCNC: 26 MMOL/L (ref 20–32)
CO2 SERPL-SCNC: 27 MMOL/L (ref 20–32)
CO2 SERPL-SCNC: 28 MMOL/L (ref 20–32)
CO2 SERPL-SCNC: 31 MMOL/L (ref 20–32)
CO2 SERPL-SCNC: 31 MMOL/L (ref 20–32)
COLOR UR AUTO: ABNORMAL
CREAT SERPL-MCNC: 0.8 MG/DL (ref 0.52–1.04)
CREAT SERPL-MCNC: 0.82 MG/DL (ref 0.52–1.04)
CREAT SERPL-MCNC: 0.94 MG/DL (ref 0.52–1.04)
CREAT SERPL-MCNC: 0.96 MG/DL (ref 0.52–1.04)
CREAT SERPL-MCNC: 1.05 MG/DL (ref 0.52–1.04)
CREAT SERPL-MCNC: 1.06 MG/DL (ref 0.52–1.04)
CREAT SERPL-MCNC: 1.1 MG/DL (ref 0.52–1.04)
CREAT SERPL-MCNC: 1.26 MG/DL (ref 0.52–1.04)
CREAT SERPL-MCNC: 1.55 MG/DL (ref 0.8–1.1)
CREAT SERPL-MCNC: 2.3 MG/DL (ref 0.52–1.04)
DIFFERENTIAL METHOD BLD: ABNORMAL
DIFFERENTIAL METHOD BLD: ABNORMAL
EOSINOPHIL # BLD AUTO: 0 10E9/L (ref 0–0.7)
EOSINOPHIL # BLD AUTO: 0 10E9/L (ref 0–0.7)
EOSINOPHIL NFR BLD AUTO: 0 %
EOSINOPHIL NFR BLD AUTO: 0 %
ERYTHROCYTE [DISTWIDTH] IN BLOOD BY AUTOMATED COUNT: 13.9 % (ref 10–15)
ERYTHROCYTE [DISTWIDTH] IN BLOOD BY AUTOMATED COUNT: 13.9 % (ref 10–15)
ERYTHROCYTE [DISTWIDTH] IN BLOOD BY AUTOMATED COUNT: 14.1 % (ref 10–15)
ERYTHROCYTE [DISTWIDTH] IN BLOOD BY AUTOMATED COUNT: 14.2 % (ref 10–15)
ERYTHROCYTE [DISTWIDTH] IN BLOOD BY AUTOMATED COUNT: 14.3 % (ref 10–15)
ERYTHROCYTE [DISTWIDTH] IN BLOOD BY AUTOMATED COUNT: 14.4 % (ref 10–15)
ERYTHROCYTE [DISTWIDTH] IN BLOOD BY AUTOMATED COUNT: 15.8 % (ref 10–15)
FLUAV+FLUBV RNA SPEC QL NAA+PROBE: NEGATIVE
FLUAV+FLUBV RNA SPEC QL NAA+PROBE: NEGATIVE
GFR SERPL CREATININE-BSD FRML MDRD: 17 ML/MIN/{1.73_M2}
GFR SERPL CREATININE-BSD FRML MDRD: 33 ML/MIN/1.73M2
GFR SERPL CREATININE-BSD FRML MDRD: 35 ML/MIN/{1.73_M2}
GFR SERPL CREATININE-BSD FRML MDRD: 42 ML/MIN/{1.73_M2}
GFR SERPL CREATININE-BSD FRML MDRD: 43 ML/MIN/{1.73_M2}
GFR SERPL CREATININE-BSD FRML MDRD: 44 ML/MIN/{1.73_M2}
GFR SERPL CREATININE-BSD FRML MDRD: 49 ML/MIN/{1.73_M2}
GFR SERPL CREATININE-BSD FRML MDRD: 50 ML/MIN/{1.73_M2}
GFR SERPL CREATININE-BSD FRML MDRD: 59 ML/MIN/{1.73_M2}
GFR SERPL CREATININE-BSD FRML MDRD: 61 ML/MIN/{1.73_M2}
GLUCOSE SERPL-MCNC: 113 MG/DL (ref 70–99)
GLUCOSE SERPL-MCNC: 63 MG/DL (ref 70–125)
GLUCOSE SERPL-MCNC: 78 MG/DL (ref 70–99)
GLUCOSE SERPL-MCNC: 83 MG/DL (ref 70–99)
GLUCOSE SERPL-MCNC: 92 MG/DL (ref 70–99)
GLUCOSE SERPL-MCNC: 92 MG/DL (ref 70–99)
GLUCOSE SERPL-MCNC: 94 MG/DL (ref 70–99)
GLUCOSE SERPL-MCNC: 96 MG/DL (ref 70–99)
GLUCOSE UR STRIP-MCNC: NEGATIVE MG/DL
HCT VFR BLD AUTO: 35.8 % (ref 35–47)
HCT VFR BLD AUTO: 37.6 % (ref 35–47)
HCT VFR BLD AUTO: 38.8 % (ref 35–47)
HCT VFR BLD AUTO: 39.2 % (ref 35–47)
HCT VFR BLD AUTO: 39.9 % (ref 35–47)
HCT VFR BLD AUTO: 40 % (ref 35–47)
HCT VFR BLD AUTO: 41.6 % (ref 35–47)
HGB BLD-MCNC: 12 G/DL (ref 11.7–15.7)
HGB BLD-MCNC: 12.6 G/DL (ref 11.7–15.7)
HGB BLD-MCNC: 13.1 G/DL (ref 11.7–15.7)
HGB BLD-MCNC: 13.2 G/DL (ref 11.7–15.7)
HGB BLD-MCNC: 13.3 G/DL (ref 11.7–15.7)
HGB BLD-MCNC: 13.6 G/DL (ref 11.7–15.7)
HGB BLD-MCNC: 13.9 G/DL (ref 11.7–15.7)
HGB UR QL STRIP: NEGATIVE
IMM GRANULOCYTES # BLD: 0.1 10E9/L (ref 0–0.4)
IMM GRANULOCYTES # BLD: 0.1 10E9/L (ref 0–0.4)
IMM GRANULOCYTES NFR BLD: 0.5 %
IMM GRANULOCYTES NFR BLD: 0.6 %
KETONES UR STRIP-MCNC: 5 MG/DL
LACTATE BLD-SCNC: 1.8 MMOL/L (ref 0.7–2)
LACTATE BLD-SCNC: 2.2 MMOL/L (ref 0.7–2)
LACTATE BLD-SCNC: 3.1 MMOL/L (ref 0.7–2)
LACTATE BLD-SCNC: 4 MMOL/L (ref 0.7–2)
LEUKOCYTE ESTERASE UR QL STRIP: ABNORMAL
LYMPHOCYTES # BLD AUTO: 0.7 10E9/L (ref 0.8–5.3)
LYMPHOCYTES # BLD AUTO: 1.6 10E9/L (ref 0.8–5.3)
LYMPHOCYTES NFR BLD AUTO: 4.2 %
LYMPHOCYTES NFR BLD AUTO: 8.4 %
Lab: NORMAL
Lab: NORMAL
MCH RBC QN AUTO: 34.2 PG (ref 26.5–33)
MCH RBC QN AUTO: 34.7 PG (ref 26.5–33)
MCH RBC QN AUTO: 34.8 PG (ref 26.5–33)
MCH RBC QN AUTO: 34.8 PG (ref 26.5–33)
MCH RBC QN AUTO: 34.9 PG (ref 26.5–33)
MCH RBC QN AUTO: 35.1 PG (ref 26.5–33)
MCH RBC QN AUTO: 35.4 PG (ref 26.5–33)
MCHC RBC AUTO-ENTMCNC: 32.8 G/DL (ref 31.5–36.5)
MCHC RBC AUTO-ENTMCNC: 33.4 G/DL (ref 31.5–36.5)
MCHC RBC AUTO-ENTMCNC: 33.5 G/DL (ref 31.5–36.5)
MCHC RBC AUTO-ENTMCNC: 33.5 G/DL (ref 31.5–36.5)
MCHC RBC AUTO-ENTMCNC: 33.7 G/DL (ref 31.5–36.5)
MCHC RBC AUTO-ENTMCNC: 34.1 G/DL (ref 31.5–36.5)
MCHC RBC AUTO-ENTMCNC: 34.3 G/DL (ref 31.5–36.5)
MCV RBC AUTO: 102 FL (ref 78–100)
MCV RBC AUTO: 103 FL (ref 78–100)
MCV RBC AUTO: 103 FL (ref 78–100)
MCV RBC AUTO: 104 FL (ref 78–100)
MCV RBC AUTO: 106 FL (ref 78–100)
MONOCYTES # BLD AUTO: 1.4 10E9/L (ref 0–1.3)
MONOCYTES # BLD AUTO: 1.7 10E9/L (ref 0–1.3)
MONOCYTES NFR BLD AUTO: 10.4 %
MONOCYTES NFR BLD AUTO: 7.4 %
MUCOUS THREADS #/AREA URNS LPF: PRESENT /LPF
NEUTROPHILS # BLD AUTO: 13.5 10E9/L (ref 1.6–8.3)
NEUTROPHILS # BLD AUTO: 15.8 10E9/L (ref 1.6–8.3)
NEUTROPHILS NFR BLD AUTO: 83.4 %
NEUTROPHILS NFR BLD AUTO: 84.8 %
NITRATE UR QL: NEGATIVE
NRBC # BLD AUTO: 0 10*3/UL
NRBC BLD AUTO-RTO: 0 /100
PH UR STRIP: 5.5 PH (ref 5–7)
PLATELET # BLD AUTO: 106 10E9/L (ref 150–450)
PLATELET # BLD AUTO: 69 10E9/L (ref 150–450)
PLATELET # BLD AUTO: 73 10E9/L (ref 150–450)
PLATELET # BLD AUTO: 73 10E9/L (ref 150–450)
PLATELET # BLD AUTO: 82 10E9/L (ref 150–450)
PLATELET # BLD AUTO: 82 10E9/L (ref 150–450)
PLATELET # BLD AUTO: 94 10E9/L (ref 150–450)
POTASSIUM SERPL-SCNC: 3.8 MMOL/L (ref 3.4–5.3)
POTASSIUM SERPL-SCNC: 3.9 MMOL/L (ref 3.4–5.3)
POTASSIUM SERPL-SCNC: 4 MMOL/L (ref 3.4–5.3)
POTASSIUM SERPL-SCNC: 4 MMOL/L (ref 3.4–5.3)
POTASSIUM SERPL-SCNC: 4.1 MMOL/L (ref 3.4–5.3)
POTASSIUM SERPL-SCNC: 4.1 MMOL/L (ref 3.5–5)
POTASSIUM SERPL-SCNC: 4.2 MMOL/L (ref 3.4–5.3)
POTASSIUM SERPL-SCNC: 4.5 MMOL/L (ref 3.4–5.3)
PROT SERPL-MCNC: 6.3 G/DL (ref 6.8–8.8)
PROT SERPL-MCNC: 6.4 G/DL (ref 6.8–8.8)
PROT SERPL-MCNC: 6.7 G/DL (ref 6–8)
RBC # BLD AUTO: 3.46 10E12/L (ref 3.8–5.2)
RBC # BLD AUTO: 3.68 10E12/L (ref 3.8–5.2)
RBC # BLD AUTO: 3.76 10E12/L (ref 3.8–5.2)
RBC # BLD AUTO: 3.76 10E12/L (ref 3.8–5.2)
RBC # BLD AUTO: 3.78 10E12/L (ref 3.8–5.2)
RBC # BLD AUTO: 3.88 10E12/L (ref 3.8–5.2)
RBC # BLD AUTO: 4 10E12/L (ref 3.8–5.2)
RBC #/AREA URNS AUTO: 2 /HPF (ref 0–2)
RSV RNA SPEC NAA+PROBE: POSITIVE
SODIUM SERPL-SCNC: 138 MMOL/L (ref 133–144)
SODIUM SERPL-SCNC: 139 MMOL/L (ref 133–144)
SODIUM SERPL-SCNC: 140 MMOL/L (ref 133–144)
SODIUM SERPL-SCNC: 140 MMOL/L (ref 133–144)
SODIUM SERPL-SCNC: 140 MMOL/L (ref 136–145)
SODIUM SERPL-SCNC: 141 MMOL/L (ref 133–144)
SODIUM SERPL-SCNC: 141 MMOL/L (ref 133–144)
SODIUM SERPL-SCNC: 144 MMOL/L (ref 133–144)
SODIUM SERPL-SCNC: 147 MMOL/L (ref 133–144)
SODIUM SERPL-SCNC: 150 MMOL/L (ref 133–144)
SOURCE: ABNORMAL
SP GR UR STRIP: 1.02 (ref 1–1.03)
SPECIMEN SOURCE: ABNORMAL
SPECIMEN SOURCE: NORMAL
SQUAMOUS #/AREA URNS AUTO: 2 /HPF (ref 0–1)
TSH SERPL DL<=0.005 MIU/L-ACNC: 4.96 MU/L (ref 0.4–4)
UROBILINOGEN UR STRIP-MCNC: 4 MG/DL (ref 0–2)
WBC # BLD AUTO: 10.8 10E9/L (ref 4–11)
WBC # BLD AUTO: 14.5 10E9/L (ref 4–11)
WBC # BLD AUTO: 15.9 10E9/L (ref 4–11)
WBC # BLD AUTO: 18.9 10E9/L (ref 4–11)
WBC # BLD AUTO: 5 10E9/L (ref 4–11)
WBC # BLD AUTO: 8.4 10E9/L (ref 4–11)
WBC # BLD AUTO: 8.6 10E9/L (ref 4–11)
WBC #/AREA URNS AUTO: 6 /HPF (ref 0–5)

## 2019-01-01 PROCEDURE — 96361 HYDRATE IV INFUSION ADD-ON: CPT

## 2019-01-01 PROCEDURE — 36415 COLL VENOUS BLD VENIPUNCTURE: CPT | Performed by: INTERNAL MEDICINE

## 2019-01-01 PROCEDURE — 25000128 H RX IP 250 OP 636: Performed by: HOSPITALIST

## 2019-01-01 PROCEDURE — 96366 THER/PROPH/DIAG IV INF ADDON: CPT

## 2019-01-01 PROCEDURE — 83605 ASSAY OF LACTIC ACID: CPT | Performed by: INTERNAL MEDICINE

## 2019-01-01 PROCEDURE — 99233 SBSQ HOSP IP/OBS HIGH 50: CPT | Performed by: INTERNAL MEDICINE

## 2019-01-01 PROCEDURE — 25000132 ZZH RX MED GY IP 250 OP 250 PS 637: Performed by: HOSPITALIST

## 2019-01-01 PROCEDURE — 96368 THER/DIAG CONCURRENT INF: CPT

## 2019-01-01 PROCEDURE — 85027 COMPLETE CBC AUTOMATED: CPT | Performed by: INTERNAL MEDICINE

## 2019-01-01 PROCEDURE — 25000132 ZZH RX MED GY IP 250 OP 250 PS 637: Performed by: INTERNAL MEDICINE

## 2019-01-01 PROCEDURE — 97161 PT EVAL LOW COMPLEX 20 MIN: CPT | Mod: GP

## 2019-01-01 PROCEDURE — 99309 SBSQ NF CARE MODERATE MDM 30: CPT | Performed by: NURSE PRACTITIONER

## 2019-01-01 PROCEDURE — 85025 COMPLETE CBC W/AUTO DIFF WBC: CPT | Performed by: EMERGENCY MEDICINE

## 2019-01-01 PROCEDURE — 96367 TX/PROPH/DG ADDL SEQ IV INF: CPT

## 2019-01-01 PROCEDURE — 40000894 ZZH STATISTIC OT IP EVAL DEFER: Performed by: OCCUPATIONAL THERAPIST

## 2019-01-01 PROCEDURE — 99285 EMERGENCY DEPT VISIT HI MDM: CPT | Mod: 25

## 2019-01-01 PROCEDURE — 80048 BASIC METABOLIC PNL TOTAL CA: CPT | Performed by: HOSPITALIST

## 2019-01-01 PROCEDURE — 81001 URINALYSIS AUTO W/SCOPE: CPT | Performed by: EMERGENCY MEDICINE

## 2019-01-01 PROCEDURE — 25000128 H RX IP 250 OP 636: Performed by: EMERGENCY MEDICINE

## 2019-01-01 PROCEDURE — 12000000 ZZH R&B MED SURG/OB

## 2019-01-01 PROCEDURE — 99207 ZZC CDG-MDM COMPONENT: MEETS MODERATE - UP CODED: CPT | Performed by: INTERNAL MEDICINE

## 2019-01-01 PROCEDURE — 80053 COMPREHEN METABOLIC PANEL: CPT | Performed by: EMERGENCY MEDICINE

## 2019-01-01 PROCEDURE — 71046 X-RAY EXAM CHEST 2 VIEWS: CPT

## 2019-01-01 PROCEDURE — 83605 ASSAY OF LACTIC ACID: CPT | Performed by: EMERGENCY MEDICINE

## 2019-01-01 PROCEDURE — 25800030 ZZH RX IP 258 OP 636: Performed by: HOSPITALIST

## 2019-01-01 PROCEDURE — 92610 EVALUATE SWALLOWING FUNCTION: CPT | Mod: GN | Performed by: SPEECH-LANGUAGE PATHOLOGIST

## 2019-01-01 PROCEDURE — 36415 COLL VENOUS BLD VENIPUNCTURE: CPT

## 2019-01-01 PROCEDURE — 97110 THERAPEUTIC EXERCISES: CPT | Mod: GP

## 2019-01-01 PROCEDURE — 21000001 ZZH R&B HEART CARE

## 2019-01-01 PROCEDURE — 36415 COLL VENOUS BLD VENIPUNCTURE: CPT | Performed by: HOSPITALIST

## 2019-01-01 PROCEDURE — 25800030 ZZH RX IP 258 OP 636: Performed by: EMERGENCY MEDICINE

## 2019-01-01 PROCEDURE — 87086 URINE CULTURE/COLONY COUNT: CPT | Performed by: HOSPITALIST

## 2019-01-01 PROCEDURE — 85027 COMPLETE CBC AUTOMATED: CPT | Performed by: HOSPITALIST

## 2019-01-01 PROCEDURE — 99308 SBSQ NF CARE LOW MDM 20: CPT | Performed by: INTERNAL MEDICINE

## 2019-01-01 PROCEDURE — 93005 ELECTROCARDIOGRAM TRACING: CPT

## 2019-01-01 PROCEDURE — 99310 SBSQ NF CARE HIGH MDM 45: CPT | Performed by: NURSE PRACTITIONER

## 2019-01-01 PROCEDURE — 96365 THER/PROPH/DIAG IV INF INIT: CPT

## 2019-01-01 PROCEDURE — 99232 SBSQ HOSP IP/OBS MODERATE 35: CPT | Performed by: INTERNAL MEDICINE

## 2019-01-01 PROCEDURE — 99223 1ST HOSP IP/OBS HIGH 75: CPT | Mod: AI | Performed by: HOSPITALIST

## 2019-01-01 PROCEDURE — 80048 BASIC METABOLIC PNL TOTAL CA: CPT | Performed by: INTERNAL MEDICINE

## 2019-01-01 PROCEDURE — 87040 BLOOD CULTURE FOR BACTERIA: CPT | Performed by: EMERGENCY MEDICINE

## 2019-01-01 PROCEDURE — 25000125 ZZHC RX 250: Performed by: EMERGENCY MEDICINE

## 2019-01-01 PROCEDURE — 99239 HOSP IP/OBS DSCHRG MGMT >30: CPT | Performed by: INTERNAL MEDICINE

## 2019-01-01 RX ORDER — ONDANSETRON 4 MG/1
4 TABLET, ORALLY DISINTEGRATING ORAL EVERY 6 HOURS PRN
Status: DISCONTINUED | OUTPATIENT
Start: 2019-01-01 | End: 2019-01-01 | Stop reason: HOSPADM

## 2019-01-01 RX ORDER — AMOXICILLIN 250 MG
1 CAPSULE ORAL 2 TIMES DAILY PRN
Status: DISCONTINUED | OUTPATIENT
Start: 2019-01-01 | End: 2019-01-01 | Stop reason: HOSPADM

## 2019-01-01 RX ORDER — DOXYCYCLINE HYCLATE 100 MG
100 TABLET ORAL EVERY 12 HOURS
COMMUNITY
Start: 2019-01-01 | End: 2019-01-01

## 2019-01-01 RX ORDER — LEVOFLOXACIN 750 MG/1
750 TABLET, FILM COATED ORAL EVERY OTHER DAY
Status: ON HOLD | COMMUNITY
Start: 2019-01-01 | End: 2019-01-01

## 2019-01-01 RX ORDER — PROCHLORPERAZINE 25 MG
12.5 SUPPOSITORY, RECTAL RECTAL EVERY 12 HOURS PRN
Status: DISCONTINUED | OUTPATIENT
Start: 2019-01-01 | End: 2019-01-01 | Stop reason: HOSPADM

## 2019-01-01 RX ORDER — IPRATROPIUM BROMIDE AND ALBUTEROL SULFATE 2.5; .5 MG/3ML; MG/3ML
1 SOLUTION RESPIRATORY (INHALATION) EVERY 4 HOURS PRN
Status: DISCONTINUED | OUTPATIENT
Start: 2019-01-01 | End: 2019-01-01 | Stop reason: HOSPADM

## 2019-01-01 RX ORDER — CEFUROXIME AXETIL 500 MG/1
500 TABLET ORAL 2 TIMES DAILY
COMMUNITY
Start: 2019-01-01 | End: 2019-01-01

## 2019-01-01 RX ORDER — NALOXONE HYDROCHLORIDE 0.4 MG/ML
.1-.4 INJECTION, SOLUTION INTRAMUSCULAR; INTRAVENOUS; SUBCUTANEOUS
Status: DISCONTINUED | OUTPATIENT
Start: 2019-01-01 | End: 2019-01-01 | Stop reason: HOSPADM

## 2019-01-01 RX ORDER — IPRATROPIUM BROMIDE AND ALBUTEROL SULFATE 2.5; .5 MG/3ML; MG/3ML
1 SOLUTION RESPIRATORY (INHALATION) EVERY 4 HOURS PRN
Start: 2019-01-01 | End: 2019-01-01

## 2019-01-01 RX ORDER — PIPERACILLIN SODIUM, TAZOBACTAM SODIUM 3; .375 G/15ML; G/15ML
3.38 INJECTION, POWDER, LYOPHILIZED, FOR SOLUTION INTRAVENOUS EVERY 6 HOURS
Status: DISCONTINUED | OUTPATIENT
Start: 2019-01-01 | End: 2019-01-01 | Stop reason: HOSPADM

## 2019-01-01 RX ORDER — AMOXICILLIN 250 MG
2 CAPSULE ORAL 2 TIMES DAILY PRN
Status: DISCONTINUED | OUTPATIENT
Start: 2019-01-01 | End: 2019-01-01 | Stop reason: HOSPADM

## 2019-01-01 RX ORDER — IPRATROPIUM BROMIDE AND ALBUTEROL SULFATE 2.5; .5 MG/3ML; MG/3ML
1 SOLUTION RESPIRATORY (INHALATION) 2 TIMES DAILY
Status: ON HOLD | COMMUNITY
End: 2019-01-01

## 2019-01-01 RX ORDER — AMOXICILLIN AND CLAVULANATE POTASSIUM 562.5; 437.5; 62.5 MG/1; MG/1; MG/1
2 TABLET, MULTILAYER, EXTENDED RELEASE ORAL EVERY 12 HOURS
COMMUNITY
Start: 2019-01-01 | End: 2019-01-01

## 2019-01-01 RX ORDER — AZITHROMYCIN 250 MG/1
250 TABLET, FILM COATED ORAL DAILY
COMMUNITY
Start: 2019-01-01 | End: 2019-01-01

## 2019-01-01 RX ORDER — PIPERACILLIN SODIUM, TAZOBACTAM SODIUM 3; .375 G/15ML; G/15ML
3.38 INJECTION, POWDER, LYOPHILIZED, FOR SOLUTION INTRAVENOUS ONCE
Status: COMPLETED | OUTPATIENT
Start: 2019-01-01 | End: 2019-01-01

## 2019-01-01 RX ORDER — AZITHROMYCIN 250 MG/1
250 TABLET, FILM COATED ORAL DAILY
Qty: 4 TABLET | Refills: 0
Start: 2019-01-01 | End: 2019-01-01

## 2019-01-01 RX ORDER — BISACODYL 10 MG
10 SUPPOSITORY, RECTAL RECTAL DAILY PRN
Status: DISCONTINUED | OUTPATIENT
Start: 2019-01-01 | End: 2019-01-01 | Stop reason: HOSPADM

## 2019-01-01 RX ORDER — SODIUM CHLORIDE 9 MG/ML
INJECTION, SOLUTION INTRAVENOUS CONTINUOUS
Status: DISCONTINUED | OUTPATIENT
Start: 2019-01-01 | End: 2019-01-01

## 2019-01-01 RX ORDER — PROCHLORPERAZINE MALEATE 5 MG
5 TABLET ORAL EVERY 6 HOURS PRN
Status: DISCONTINUED | OUTPATIENT
Start: 2019-01-01 | End: 2019-01-01 | Stop reason: HOSPADM

## 2019-01-01 RX ORDER — ASPIRIN 81 MG/1
81 TABLET ORAL DAILY
Status: DISCONTINUED | OUTPATIENT
Start: 2019-01-01 | End: 2019-01-01 | Stop reason: HOSPADM

## 2019-01-01 RX ORDER — ACETAMINOPHEN 325 MG/1
650 TABLET ORAL EVERY 4 HOURS PRN
Status: DISCONTINUED | OUTPATIENT
Start: 2019-01-01 | End: 2019-01-01 | Stop reason: HOSPADM

## 2019-01-01 RX ORDER — IPRATROPIUM BROMIDE AND ALBUTEROL SULFATE 2.5; .5 MG/3ML; MG/3ML
1 SOLUTION RESPIRATORY (INHALATION) 2 TIMES DAILY
COMMUNITY
Start: 2019-01-01 | End: 2019-01-01

## 2019-01-01 RX ORDER — CEFUROXIME AXETIL 500 MG/1
500 TABLET ORAL 2 TIMES DAILY
Qty: 14 TABLET | Refills: 0
Start: 2019-01-01 | End: 2019-01-01

## 2019-01-01 RX ORDER — DILTIAZEM HYDROCHLORIDE 5 MG/ML
5 INJECTION INTRAVENOUS ONCE
Status: COMPLETED | OUTPATIENT
Start: 2019-01-01 | End: 2019-01-01

## 2019-01-01 RX ORDER — METOPROLOL TARTRATE 25 MG/1
12.5 TABLET, FILM COATED ORAL 2 TIMES DAILY
Start: 2019-01-01

## 2019-01-01 RX ORDER — ONDANSETRON 2 MG/ML
4 INJECTION INTRAMUSCULAR; INTRAVENOUS EVERY 6 HOURS PRN
Status: DISCONTINUED | OUTPATIENT
Start: 2019-01-01 | End: 2019-01-01 | Stop reason: HOSPADM

## 2019-01-01 RX ADMIN — ASPIRIN 81 MG: 81 TABLET, COATED ORAL at 09:38

## 2019-01-01 RX ADMIN — ASPIRIN 81 MG: 81 TABLET, COATED ORAL at 09:25

## 2019-01-01 RX ADMIN — PIPERACILLIN SODIUM,TAZOBACTAM SODIUM 3.38 G: 3; .375 INJECTION, POWDER, FOR SOLUTION INTRAVENOUS at 06:16

## 2019-01-01 RX ADMIN — PIPERACILLIN SODIUM,TAZOBACTAM SODIUM 3.38 G: 3; .375 INJECTION, POWDER, FOR SOLUTION INTRAVENOUS at 11:01

## 2019-01-01 RX ADMIN — METOPROLOL TARTRATE 12.5 MG: 25 TABLET ORAL at 08:39

## 2019-01-01 RX ADMIN — PIPERACILLIN SODIUM,TAZOBACTAM SODIUM 3.38 G: 3; .375 INJECTION, POWDER, FOR SOLUTION INTRAVENOUS at 22:09

## 2019-01-01 RX ADMIN — GUAIFENESIN 10 ML: 200 SOLUTION ORAL at 12:03

## 2019-01-01 RX ADMIN — SODIUM CHLORIDE 500 MG: 9 INJECTION, SOLUTION INTRAVENOUS at 18:23

## 2019-01-01 RX ADMIN — DILTIAZEM HYDROCHLORIDE 5 MG: 5 INJECTION INTRAVENOUS at 18:23

## 2019-01-01 RX ADMIN — METOPROLOL TARTRATE 12.5 MG: 25 TABLET ORAL at 09:25

## 2019-01-01 RX ADMIN — SODIUM CHLORIDE 1701 ML: 9 INJECTION, SOLUTION INTRAVENOUS at 16:49

## 2019-01-01 RX ADMIN — METOPROLOL TARTRATE 12.5 MG: 25 TABLET ORAL at 09:38

## 2019-01-01 RX ADMIN — PIPERACILLIN SODIUM,TAZOBACTAM SODIUM 3.38 G: 3; .375 INJECTION, POWDER, FOR SOLUTION INTRAVENOUS at 04:54

## 2019-01-01 RX ADMIN — PIPERACILLIN SODIUM,TAZOBACTAM SODIUM 3.38 G: 3; .375 INJECTION, POWDER, FOR SOLUTION INTRAVENOUS at 00:32

## 2019-01-01 RX ADMIN — GUAIFENESIN 10 ML: 200 SOLUTION ORAL at 22:36

## 2019-01-01 RX ADMIN — PIPERACILLIN SODIUM,TAZOBACTAM SODIUM 3.38 G: 3; .375 INJECTION, POWDER, FOR SOLUTION INTRAVENOUS at 17:15

## 2019-01-01 RX ADMIN — GUAIFENESIN 10 ML: 200 SOLUTION ORAL at 17:42

## 2019-01-01 RX ADMIN — METOPROLOL TARTRATE 12.5 MG: 25 TABLET ORAL at 20:33

## 2019-01-01 RX ADMIN — PIPERACILLIN SODIUM,TAZOBACTAM SODIUM 3.38 G: 3; .375 INJECTION, POWDER, FOR SOLUTION INTRAVENOUS at 17:41

## 2019-01-01 RX ADMIN — PIPERACILLIN SODIUM,TAZOBACTAM SODIUM 3.38 G: 3; .375 INJECTION, POWDER, FOR SOLUTION INTRAVENOUS at 22:37

## 2019-01-01 RX ADMIN — PIPERACILLIN SODIUM,TAZOBACTAM SODIUM 3.38 G: 3; .375 INJECTION, POWDER, FOR SOLUTION INTRAVENOUS at 12:02

## 2019-01-01 RX ADMIN — DILTIAZEM HYDROCHLORIDE 2.5 MG/HR: 5 INJECTION INTRAVENOUS at 19:01

## 2019-01-01 RX ADMIN — PIPERACILLIN SODIUM,TAZOBACTAM SODIUM 3.38 G: 3; .375 INJECTION, POWDER, FOR SOLUTION INTRAVENOUS at 16:49

## 2019-01-01 RX ADMIN — ASPIRIN 81 MG: 81 TABLET, COATED ORAL at 08:39

## 2019-01-01 RX ADMIN — GUAIFENESIN 10 ML: 200 SOLUTION ORAL at 11:14

## 2019-01-01 RX ADMIN — PIPERACILLIN SODIUM,TAZOBACTAM SODIUM 3.38 G: 3; .375 INJECTION, POWDER, FOR SOLUTION INTRAVENOUS at 11:14

## 2019-01-01 RX ADMIN — SODIUM CHLORIDE 250 MG: 9 INJECTION, SOLUTION INTRAVENOUS at 18:29

## 2019-01-01 RX ADMIN — PIPERACILLIN SODIUM,TAZOBACTAM SODIUM 3.38 G: 3; .375 INJECTION, POWDER, FOR SOLUTION INTRAVENOUS at 06:14

## 2019-01-01 RX ADMIN — METOPROLOL TARTRATE 12.5 MG: 25 TABLET ORAL at 20:32

## 2019-01-01 RX ADMIN — SODIUM CHLORIDE 500 ML: 9 INJECTION, SOLUTION INTRAVENOUS at 15:58

## 2019-01-01 RX ADMIN — GUAIFENESIN 10 ML: 200 SOLUTION ORAL at 09:25

## 2019-01-01 RX ADMIN — SODIUM CHLORIDE 250 MG: 9 INJECTION, SOLUTION INTRAVENOUS at 19:21

## 2019-01-01 RX ADMIN — SODIUM CHLORIDE: 9 INJECTION, SOLUTION INTRAVENOUS at 21:51

## 2019-01-01 ASSESSMENT — MIFFLIN-ST. JEOR
SCORE: 955.59
SCORE: 1003.21
SCORE: 995.88
SCORE: 1003.21
SCORE: 947.88
SCORE: 964.88
SCORE: 1011.83
SCORE: 994.88
SCORE: 1006.38

## 2019-01-01 ASSESSMENT — ACTIVITIES OF DAILY LIVING (ADL)
ADLS_ACUITY_SCORE: 37
ADLS_ACUITY_SCORE: 25

## 2019-01-20 NOTE — PROGRESS NOTES
Essie Jernigan is a 89 year old female seen  January 14, 2019 at [facility] where she has resided for 7 years (admit 2012) seen for routine visit.     Patient is seen in her room, up to  before lunch.    Bright and joking; states she feels well.   Denies pain, dyspnea or other symptoms.    No new concerns reported by nursing staff.     Stands to transfer bed to , no recent falls.    By chart review, patient has been in LTC since fall with pelvic fracture in 2012.   Had frontal lobe CVI in 2013, with resulting cognitive changes, no hospitalization since then.   Has a h/o HTN but not currently needing bp lowering meds; she has a h/o diastolic CHF, aortic stenosis and PAF.  Treated with just ASA given age and goals of care.        PMH:  PAF /Atrial flutter  Chronic diastolic CHF   ECHO 60-65% in 2013, JAMESON, MR, aortic sclerosis  Dementia, late onset  S/p right frontal lobe CVI, 2013  HTN  S/p hip fracture, 2007  S/p pelvic fracture, 2012  BCC, face  OA, s/p PADDY    SH:  , previously lived independently in an apartment.     Daughter, grandchildren live locally.     ROS:  Limited, but negative except for above.    SLUMS 16/30   BIMS 10/15  Wt Readings from Last 5 Encounters:   01/14/19 58.9 kg (129 lb 12.8 oz)   11/09/18 58.1 kg (128 lb)   09/12/18 57.6 kg (127 lb)   07/13/18 56.3 kg (124 lb 3.2 oz)   03/05/18 55.8 kg (123 lb)        EXAM:  Alert, NAD  /76   Pulse 78   Temp 97.2  F (36.2  C)   Resp 18   Wt 58.9 kg (129 lb 12.8 oz)   SpO2 95%   BMI 19.74 kg/m     +kyphosis  Neck supple without adenopathy  Lungs clear bilaterally with fair air movement  Heart RRR s1s2, 2/6 CHEN, occ ectopy  Abd soft, NT, no distention, +BS  Ext trace edema, with tubi-  Psych: affect pleasant  Neuro: non-focal.    Labs reviewed.     IMP/PLAN:  (I50.42) Chronic combined systolic and diastolic congestive heart failure (H)   Comment: mild LE edema, likely stasis  Plan: follow for symptoms, treat if  uncomfortable.  Continue tubi-, elevation as tolerated.          (I48.0) PAF (paroxysmal atrial fibrillation) (H)  Comment: controlled VR without rate-slowing medications.    Plan: daily low dose aspirin    (G30.9,  F01.50,  F02.80) Mixed Alzheimer's and vascular dementia  Comment: gradual decline  Plan: LTC support for meds, meals, activity, cares.       (M80.88XG) Osteoporotic compression fracture of spine  Comment: occ pain  Plan: local measures, prn analgesia  Remains on daily vitamin D.     (Z86.73) History of CVA (cerebrovascular accident)  Comment: right frontal lobe  Plan: continue ASA for secondary prevention.     (K59.01) Slow transit constipation  Comment: well-managed  Plan: continue daily Senna, monitor    AD: DNR/DNI, comfort focus     Alejandra Orozco MD

## 2019-02-01 NOTE — PROGRESS NOTES
"[facility]    Chief Complaint   Patient presents with     Nursing Home Acute     Weakness       [place] Medical Record Number:  [id number]  Place of Service where encounter took place:  [facility] [[id number]]    HPI:    Essie Jernigan is a 89 year old  (8/9/1920), who is being seen today for an episodic care visit.  HPI information obtained from: facility chart records, facility staff, patient report and [facility] chart review.    Today's concern is:  Weakness  Called to see to patient urgently due to leaning to left side in wheelchair.  Nursing assistant noted she need a little more help transferring to wheelchair this morning. Went to exercise, but didn't really participate. Leaning to left after class and drooling. But per nursing, responded immediately and moved all extremities.     Nursing notes reviewed --   2/1/2019 10:56: \"Nurses Note Note Text: Patient was brought up to the floor by an  stating that she was slow in responding and was leaning on her left side with some drooling. Writer assessed patient's VS for possible TIA since she has history of TIAs. VSS (t 96.6, P 85 regular, R 18 regular & unlabored, SPO2 93% R.A, /74 & 129/63 R.A. laying down. Patient follows commands by knowing where she is, PERRLA, and follow pen when asked to follow it as it is moved to right, left, up, and down. Reports right upper abdominal pain at this time. Resident is laying down in bed.NP was called and a voicemail was left.\"    Reports she feels \"fine\" - sitting up at lunch stable filling out menu herself. No HA. No vision change. No cough. No SOB. No CP. Does report mild abd pain right lower near hip, feels better when adjust posture in wheelchair. + constipation, no BM for several days. No dysuria. No joint pain. Mental status at baseline.       History of CVA (cerebrovascular accident)  Suffered R frontal lobe CVA in December 2013.  Had " transient left sided weakness.  Started on full strength ASA and Statin.  Currently only on baby aspirin.   Statin stopped due to age and goals of care.    Late onset Alzheimer's disease without behavioral disturbance  Resident of LTC unit since 2012 after TCU stay for rehab after L superior and inferior pubic rami fracture.  Scored 16/30 on SLUMS in September 2013.  Last BIMS 12/15.  Needs assistance with ADLs, mobility, safety, medications, and meals.  Her mood and behavior have been stable per staff and family.   Can stand to transfer, but is mostly wheelchair bound.     Chronic combined systolic and diastolic congestive heart failure (H)  Essential hypertension, benign  HFpEF, last echocardiogram 2013 with EF of 60-65% w/ aortic stenosis.  Not on any cardiovascular medications.  No CP or SOB.   Recently had some leg swelling, but controlled with compression.    Recent VS reviewed:  BP Readings from Last 3 Encounters:   02/01/19 119/74   01/14/19 124/76   11/09/18 138/86     Pulse Readings from Last 4 Encounters:   02/01/19 85   01/14/19 78   11/09/18 92   09/12/18 96     Slow transit constipation  Feels constipated today with slight R LQ discomfort. Hx of appendectomy.  Last documented BM 1/30 and before that 1/27.   On senna-s one tablet PO BID.  No N/V. Up and ready to eat lunch.  She is thin, but weight is stable ~ 125#.   She is on nutritional supplements.    Wt Readings from Last 5 Encounters:   02/01/19 58.3 kg (128 lb 9.6 oz)   01/14/19 58.9 kg (129 lb 12.8 oz)   11/09/18 58.1 kg (128 lb)   09/12/18 57.6 kg (127 lb)   07/13/18 56.3 kg (124 lb 3.2 oz)       ALLERGIES: No known allergies     Past Medical, Surgical, Family and Social History reviewed and updated in EPIC.    Current Outpatient Medications   Medication Sig Dispense Refill     acetaminophen (TYLENOL) 325 MG tablet Take 650 mg by mouth every 6 hours as needed        AMOXICILLIN PO Give 2 grams by mouth 1 hour prior to dental appts       aspirin  "81 MG tablet Take 81 mg by mouth daily        Nutritional Supplements (ENSURE PLUS) LIQD Take 8 oz by mouth 3 times daily (with meals)       Nutritional Supplements (NUTRITIONAL DRINK PO) Take 6 oz by mouth daily (with breakfast)       senna-docusate (SENOKOT-S;PERICOLACE) 8.6-50 MG per tablet Take 1 tablet by mouth 2 times daily       VITAMIN D, CHOLECALCIFEROL, PO Take 2,000 Units by mouth daily       Medications reviewed:  Medications reconciled to facility chart and changes were made to reflect current medications as identified as above med list. Below are the changes that were made:   Medications stopped since last EPIC medication reconciliation:   There are no discontinued medications.    Medications started since last [facility] medication reconciliation:  No orders of the defined types were placed in this encounter.    REVIEW OF SYSTEMS:  10 point ROS of systems including Constitutional, Eyes, Respiratory, Cardiovascular, Gastroenterology, Genitourinary, Integumentary, Musculoskeletal, Psychiatric were all negative except for pertinent positives noted in my HPI.    Physical Exam:  /74   Pulse 85   Temp 96.6  F (35.9  C)   Resp 18   Ht 1.727 m (5' 8\")   Wt 58.3 kg (128 lb 9.6 oz)   SpO2 93%   BMI 19.55 kg/m    GENERAL APPEARANCE:  Alert, in no distress, thin, well dressed w/ jewelry, up in wheelchair today  EYES: lids normal, sclera clear and conjunctiva normal, no discharge, EOM intact, no nystagmus, pupils 2+ BL equal round reactive to slight BL, wearing pink rimmed glasses.  ENT:  Mouth normal, poor dentition, moist mucous membranes, nose without drainage or crusting, hearing acuity good for age   NECK:  Nontender, supple, symmetrical; no cervical adenopathy, masses or thyromegaly, trachea midline  RESP:  Non-labored breathing, palpation of chest normal, no chest wall tenderness, no respiratory distress, lung sounds clear, no rales/wheezes/rhonchi, patient is on room air  CV:  Palpation - no " murmur/non-displaced PMI, Auscultation - rate and rhythm regular II/VI systolic murmur.  VASCULAR: 1+ edema bilateral lower extremities at ankle, wearing tubigrips.  ABDOMEN:  Rounded abd, normal bowel sounds, soft, slight tenderness R LQ near hip, due to scoliosis iliac crest nearly touches bottom of rib cage, adjust posture with pillow and wheelchair and pain improve,  no grimacing or guarding with palpation. No hepatosplenomegaly. No appreciable masses.  M/S:   Gait and station wheelchair bound. Spine is not straight, scoliosis. Knees without tenderness to palpation or swelling. 4/5 strength biceps and with should abduction BL. 4-/5 strength with seated hip flexion and knee extension BL.   SKIN:  Inspection - no visible rashes/lesions/uclerations. Palpation- no increased warmth, skin is dry and non-tender.  NEURO: cranial nerves II-XII grossly intact, no facial asymmetry, follows simple commands, moves all extremities symmetrically, normal tone, no tremor  PSYCH: awake and alert, speech fluent,  insight and judgement impaired, calm and cooperative w/ exam.    Recent Labs:   CBC RESULTS:   Recent Labs   Lab Test 12/07/16  0500 12/20/13  0805 12/19/13  1423   WBC  --  4.8 5.2   RBC  --  3.57* 3.67*   HGB 12.5 12.2 12.5   HCT  --  35.7 37.0   MCV  --  100 101*   MCH  --  34.2* 34.1*   MCHC  --  34.2 33.8   RDW  --  13.3 13.3   PLT  --  125* 142*       Last Basic Metabolic Panel:  Recent Labs   Lab Test 12/07/16  0500 09/01/15 12/21/13  0821    138 138   POTASSIUM 4.3 4.3 4.1   CHLORIDE 105 104 107   RIK 8.1* 8.1 8.5   CO2 29  --  27   BUN 23 22 16   CR 0.85 0.81 0.69   GLC 77 83 98       TSH   Date Value Ref Range Status   12/07/2016 4.89 (H) 0.40 - 4.00 mU/L Final   12/19/2013 2.90 0.4 - 5.0 mU/L Final       Lab Results   Component Value Date    A1C 5.5 12/20/2013     Assessment/Plan:  (R53.1) Weakness  (primary encounter diagnosis)  Comment: Increased weakness today after exercise class, resolved by lunch.  Staff concerned for TIA, but no focal deficit, feeling well except for constipation.   Reviewed with daughter, conservative mgmt given advanced age.  Plan:   - Daughter okay with labs: CBC, CMP, TSH   - VS BID x 3 days    (Z86.73) History of CVA (cerebrovascular accident)  Comment: Occurred in 2013, R frontal lobe.   Plan:   - Continue ASA 81 mg daily for secondary prevention.   If true TIA or concern for recurrent CVA could increase ASA to 325 mg or change to Plavix. Will monitor for now, discussed with daughter.  - Statin stopped due to age and goals of care  - If downturn due to suspected CVA daughter would want comfort care    (G30.1,  F02.80) Late onset Alzheimer's disease without behavioral disturbance  (primary encounter diagnosis)  Comment: Slow progressive decline in function and cognition, mood and behavior stable  Plan:  - Continue supportive care in LTC environment   - Focus on comfort and QoL  - Daughter okay with labs due to weakness    (I50.42) Chronic combined systolic and diastolic congestive heart failure (H)  (I10) Essential hypertension, benign  Comment: Stable off antihypertensive medications, weight stable, some leg swelling, lungs clear.  Plan:   - Monitor weights  - Monitor VS BID x 3 days   - Continue ASA 81 mg daily     (K59.01) Slow transit constipation  Comment: Feeling constipated, some R LQ discomfort better with repositioning in chair,  BM q 3 days per bowel record, lifelong problem per daughter, seems worse today.  Plan:   - Increase senna-s to two tablets PO BID, hold for loose stool.    Called daughter, Vivian, to update.    Electronically signed by  DEAN Purcell CNP

## 2019-02-01 NOTE — PROGRESS NOTES
"This patient's medication list and chart were reviewed as part of the service provided by [facility].    Assessment/Recommendations:    No recommendations for changes at this time.    Noted following from recent NP note: \" If true TIA or concern for recurrent CVA could increase ASA to 325 mg or change to Plavix. Will monitor for now, discussed with daughter.\"    Of note, if change made, especially if PVD present, I would favor switch to Plavix vs increasing ASA dose.  CAPRIE trial showed improved benefit of Plavix vs ASA for secondary prevention of stroke in those with PVD.          Savannah Sanford, Pharm.D.,[facility]  Board Certified Geriatric Pharmacist  Medication Therapy Management Pharmacist  [phone]          "

## 2019-02-28 NOTE — PROGRESS NOTES
[facility]    Chief Complaint   Patient presents with     retirement Regulatory       [place] Medical Record Number:  [id number]    HPI:    Essie Jernigan is a 89 year old  (8/9/1920), who is being seen today for an episodic care visit at [facility].      HPI information obtained from: facility chart records, facility staff, patient report and [facility] chart review.    Today's concern is:  Late onset Alzheimer's disease without behavioral disturbance  Scored 16/30 in SLUMS in September 2013.  Last BIMS 12/15 in November 2019.  Has resided on LTC unit since 2012 after TCU stay for rehab after pelvic fracture.  Needs assistance with ADLs, mobility, safety, medications, and meals.  No longer ambulatory, but can stand to transfer. No recent falls.  Her mood and behavior have been stable.  Episode of weakness last month 2/1, resolved spontaneously. Labs 2/2 without acute abnormality.      Chronic combined systolic and diastolic congestive heart failure (H)  Essential hypertension, benign  Afib  Also hx of aflutter.  Metoprolol stopped due to bradycardia.  Not on any cardiovascular medications.  Last echo 12/19/13 and showed EF 60-65%.  No CP or SOB.   Increased leg swelling over last several months.   Weight is stable.            Wt Readings from Last 5 Encounters:   02/28/19 57.8 kg (127 lb 6.4 oz)   02/01/19 58.3 kg (128 lb 9.6 oz)   01/14/19 58.9 kg (129 lb 12.8 oz)   11/09/18 58.1 kg (128 lb)   09/12/18 57.6 kg (127 lb)     CKD stage III  Baseline Cr ~ 1. No routine labs per family wish and goals of care.  On 2/2/19: Cr 0.94.  No labs on 2/4 for this patient in [facility] (facility EMR), but Epic shows Cr 1.55.  Around nursing requested visit for episode of fatigue.      Slow transit constipation  Having regular BMs.On senna-s two tablets PO BID.  No abd pain. No N/V.   She is thin, but weight is stable. Body mass index is 19.37 kg/m .  She is on nutritional supplements and  multivitamin.    H/O compression fracture of spine  Compression deformities of lower lumbar vertebra L3-L5 and moderate age-in-determinant compression fractures of T8-T11.  No back pain today.  PRN Tylenol in place for mild pain.   On vitamin D supplement.    History of CVA (cerebrovascular accident)  R frontal lobe CVA in December 2013 and was started on full strength ASA and Statin.  Currently only on baby aspirin. Statin stopped due to age and goals of care.    ALLERGIES: No known allergies     Past Medical, Surgical, Family and Social History reviewed and updated in [facility].    Current Outpatient Medications   Medication Sig Dispense Refill     acetaminophen (TYLENOL) 325 MG tablet Take 650 mg by mouth every 6 hours as needed        AMOXICILLIN PO Give 2 grams by mouth 1 hour prior to dental appts       aspirin 81 MG tablet Take 81 mg by mouth daily        Nutritional Supplements (ENSURE PLUS) LIQD Take 8 oz by mouth 3 times daily (with meals)       senna-docusate (SENOKOT-S;PERICOLACE) 8.6-50 MG per tablet Take 2 tablets by mouth 2 times daily        VITAMIN D, CHOLECALCIFEROL, PO Take 2,000 Units by mouth daily       Medications reviewed:  Medications reconciled to facility chart and changes were made to reflect current medications as identified as above med list. Below are the changes that were made:   Medications stopped since last EPIC medication reconciliation:   There are no discontinued medications.    Medications started since last [facility] medication reconciliation:  No orders of the defined types were placed in this encounter.    Case Management:  I have reviewed the care plan and MDS (11/27/18) and do agree with the plan. Patient's desire to return to the community is not present.  Information reviewed:  Medications, vital signs, orders, and nursing notes.  - BIMS 12/15  - PHQ-9 0/27    REVIEW OF SYSTEMS:  Limited secondary to cognitive impairment but today pt reports no pain or dyspnea.     Physical  "Exam:  /73   Pulse 90   Temp 96.6  F (35.9  C)   Resp 18   Ht 1.727 m (5' 8\")   Wt 57.8 kg (127 lb 6.4 oz)   SpO2 97%   BMI 19.37 kg/m    GENERAL APPEARANCE:  Alert, in no distress, thin, well dressed w/ jewelry, up in wheelchair today  EYES: lids normal, sclera clear and conjunctiva normal, no discharge.  ENT:  Mouth normal, poor dentition, moist mucous membranes, nose without drainage or crusting, hearing acuity good for age   NECK:  Nontender, supple, symmetrical; no cervical adenopathy, masses or thyromegaly, trachea midline  RESP:  Non-labored breathing, palpation of chest normal, no chest wall tenderness, no respiratory distress, lung sounds clear, no rales/wheezes/rhonchi, patient is on room air  CV:  Palpation - no murmur/non-displaced PMI, Auscultation - rate and rhythm regular II/VI systolic murmur.  VASCULAR: Trace edema bilateral lower extremities at ankle, wearing tubigrips.  ABDOMEN:  Rounded abd, normal bowel sounds, soft, non-distended, non-tender. No appreciable masses.  M/S:   Gait and station wheelchair bound. Spine is not straight, scoliosis. Knees without tenderness to palpation or swelling.   SKIN:  Inspection - no visible rashes/lesions/uclerations. Palpation- no increased warmth, skin is dry and non-tender.  NEURO: cranial nerves II-XII grossly intact, no facial asymmetry, follows simple commands, moves all extremities symmetrically, normal tone, no tremor  PSYCH: awake and alert, speech fluent,  insight and judgement impaired, calm and cooperative w/ exam.    Recent Labs:  CBC RESULTS:   Recent Labs   Lab Test 02/02/19  1433 12/07/16  0500 12/20/13  0805   WBC 5.0  --  4.8   RBC 3.78*  --  3.57*   HGB 13.2 12.5 12.2   HCT 39.2  --  35.7   *  --  100   MCH 34.9*  --  34.2*   MCHC 33.7  --  34.2   RDW 13.9  --  13.3   *  --  125*       Last Basic Metabolic Panel:  Recent Labs   Lab Test 02/04/19 02/02/19  1433    140   POTASSIUM 4.1 4.1   CHLORIDE 106 106   RIK " 8.7 8.2*   CO2 25 31   BUN 30* 18   CR 1.55* 0.94   GLC 63* 83       Liver Function Studies -   Recent Labs   Lab Test 02/04/19 02/02/19  1433   PROTTOTAL 6.7 6.3*   ALBUMIN 3.0* 2.6*   BILITOTAL 0.6 0.8   ALKPHOS 61 87   AST 13 25   ALT <9 15       TSH   Date Value Ref Range Status   02/02/2019 4.96 (H) 0.40 - 4.00 mU/L Final   12/07/2016 4.89 (H) 0.40 - 4.00 mU/L Final       Lab Results   Component Value Date    A1C 5.5 12/20/2013     Assessment/Plan:  (G30.1,  F02.80) Late onset Alzheimer's disease without behavioral disturbance  (primary encounter diagnosis)  Comment: Overall slow progressive decline in function and cognition, but stable interval for resident. No change in mood/behavior.  Plan:  - Continue supportive care in LTC environment   - Focus on comfort and QoL    (I50.42) Chronic combined systolic and diastolic congestive heart failure (H)  (I10) Essential hypertension, benign  (I48.0) PAF  Comment: Leg swelling today, but lungs clear no dyspnea, weight stable - possible PVD. Vital signs stable off antihypertensive medications. HR controlled off beta-blocker  Plan:   - Agreeable to tubigrips on q  AM and off q HS  - Staff to assist resident to elevate legs TID  - Monitor weights, PRN labs  - Routine VS    (N18.3) CKD stage III  Comment: Baseline Cr ~ 1, on 2/2: 0.94 and 2/4 1.55 -- list lab not in facility EMR.  Plan:   - Question accuracy of 2/4 labs, will repeat BMP 3/6  - Renally dose medications and avoid nephrotoxins      (K59.01) Slow transit constipation  Comment: Having regular BMs  Plan:   - Continue senna-s two tablets PO BID.    (Z87.81) H/O compression fracture of spine  Comment: No back pain reported, hx of compression fracture.  Plan:   - PRN tylenol in place  - Continue vitamin D supplement    (Z86.73) History of CVA (cerebrovascular accident)  Comment: Hx of CVA in 2013, R frontal lobe.   Plan:   - Continue ASA 81 mg daily for secondary prevention  - Statin stopped due to age and goals  of care    Electronically signed by  DEAN Herbert CNP

## 2019-03-06 NOTE — TELEPHONE ENCOUNTER
89 y/o female with hx of afib/aflutter, HFpEF, HTN, alzheimer's dementia.    LTC nurse called to report congestion and coarse breath sounds.   Multiple residents with viral respiratory illness on unit.     VS as reported by nurse: 99.5 F, 133/80,  91% RA, RR 20, initially reported pulse as 121, requested apical pulse which was 69,    Last Comprehensive Metabolic Panel:  Sodium   Date Value Ref Range Status   03/06/2019 138 133 - 144 mmol/L Final     Potassium   Date Value Ref Range Status   03/06/2019 4.0 3.4 - 5.3 mmol/L Final     Chloride   Date Value Ref Range Status   03/06/2019 105 94 - 109 mmol/L Final     Carbon Dioxide   Date Value Ref Range Status   03/06/2019 25 20 - 32 mmol/L Final     Anion Gap   Date Value Ref Range Status   03/06/2019 8 3 - 14 mmol/L Final     Glucose   Date Value Ref Range Status   03/06/2019 96 70 - 99 mg/dL Final     Urea Nitrogen   Date Value Ref Range Status   03/06/2019 21 7 - 30 mg/dL Final     Creatinine   Date Value Ref Range Status   03/06/2019 0.82 0.52 - 1.04 mg/dL Final     GFR Estimate   Date Value Ref Range Status   03/06/2019 59 (L) >60 mL/min/[1.73_m2] Final     Calcium   Date Value Ref Range Status   03/06/2019 8.2 (L) 8.5 - 10.1 mg/dL Final     ORDERS:  - Chest x-ray 2 views   - Swab for influenza and RSV  - SpO2 q 4 hours over night   - Guaifenesin 100 mg/5 mL, 10 mL PO QID x 7 days    Daughter would likely want reversible illness treated per previous advance care planning discussion.

## 2019-03-07 NOTE — TELEPHONE ENCOUNTER
89 y/o female with CHF, afib, dementia.     [place]urse [place] [facility] [place] to reports results of CXR.  Impression:  Chronic lung finding with developing R LL infilrate and tiny right pleural effusion     + RSV on swab. Influenza negative.      GFR Estimate   Date Value Ref Range Status   03/06/2019 59 (L) >60 mL/min/[1.73_m2] Final     Estimated Creatinine Clearance: 35 mL/min (based on SCr of 0.82 mg/dL).    Vital Signs from today:         Allergies   Allergen Reactions     No Known Allergies        ORDERS:  - Augmentin 2 grams PO q 12 hours x 7 days - reviewed dosing w/ PharmD  - Doxycycline 100 mg PO q 12 hours x 7 days   - CBC and BMP   - Encourage PO fluids 240 mL QID between meals  - VS q shift notify provider of abnormal  - Droplet precautions  - Notify family of change in condition   - Will see for clinical follow-up tomorrow

## 2019-03-08 NOTE — PROGRESS NOTES
"[facility]  [place] Medical Record Number:  [id number]  Place of Service where encounter took place:  [facility] ([facility]) [[id number]]  Chief Complaint   Patient presents with     Nursing Home Acute     respiratory illness        HPI:    Essie Jernigan  is a 89 year old (8/9/1920) PMH significant for PAF/aflutter, HFpEF, dementia, hx of CVA (right frontal lobe), OA (s/p PADDY), who is being seen today for an episodic care visit.      HPI information obtained from: facility chart records, facility staff, patient report and [facility] chart review.     Resident for [facility] since 2012 after TCU stay for rehab after pelvic fracture. Non-ambulatory, but able to stand for transfers. History of a.flutter, off beta-blocker for rate control due to bradycardia. History of HFpEF, last EF 60-65% in December 2013. No increased legs swelling or dyspnea.     Follow-up today for respiratory illness.      Today's concern is:  RSV  Right Lower Lobe Pneumonia  Two days ago nursing reported increased congestion and coarse breath sounds. Multiple residents on unit with respiratory illness.  Limited history from resident due to dementia.  Nasal swab positive for RSV, negative for influenza.  Yesterday started on Augmentin and doxycycline for possible right lower lobe pneumonia.   Up in chair eating break asks for water.  No dyspnea. + cough. No fever or hypoxia. Mental status at baseline.   States, \"I feel great!\"     Chest x-ray 3/7/19:  Impression  1. Chronic lung findings with developing right lower lobe infiltrate  2. Tiny right pleural effusion is seen    HFpEF  HTN  Afib  CKD stage III  History of aflutter, metoprolol stopped due to bradycardia.  Last echo 12/19/13 showed EF 60-65%.  No CP or SOB. No dizziness.   Weight is stable.No leg swelling.  CKD stage III, Cr today 0.8 and GFR estimate 61 mL/min.   Not on any cardiovascular medications.    Dementia  \"I thought I was 89!\"   Scored " "16/30 in SLUMS in September 2013.  Last BIMS 12/15 in November 2019.  Needs assistance with ADLs, mobility, safety, medications, and meals.  No longer ambulatory, but can stand to transfer. No recent falls.  Her mood and behavior have been stable.         BP Readings from Last 3 Encounters:   03/08/19 115/74   02/28/19 125/73   02/01/19 119/74     Pulse Readings from Last 4 Encounters:   03/08/19 92   02/28/19 90   02/01/19 85   01/14/19 78     Wt Readings from Last 5 Encounters:   03/08/19 57.5 kg (126 lb 11.2 oz)   02/28/19 57.8 kg (127 lb 6.4 oz)   02/01/19 58.3 kg (128 lb 9.6 oz)   01/14/19 58.9 kg (129 lb 12.8 oz)   11/09/18 58.1 kg (128 lb)     Past Medical and Surgical History reviewed in Epic today.    REVIEW OF SYSTEMS:  Limited secondary to cognitive impairment but today pt reports \"I feel great!\"     Objective:  /74   Pulse 92   Temp 98.9  F (37.2  C)   Resp 18   Ht 1.727 m (5' 8\")   Wt 57.5 kg (126 lb 11.2 oz)   SpO2 92%   BMI 19.26 kg/m    Exam:  GENERAL APPEARANCE:  Alert, in no distress, thin, well dressed w/ jewelry, up in wheelchair today  EYES: lids normal, sclera clear and conjunctiva normal, no discharge.  ENT:  Mouth normal, poor dentition, moist mucous membranes, nose without drainage or crusting, hearing acuity good for age   NECK:  Nontender, supple, symmetrical; no cervical adenopathy, masses or thyromegaly, trachea midline  RESP:  Non-labored breathing, palpation of chest normal, no chest wall tenderness, no respiratory distress, scattered rhonchi, wet cough, patient is on room air. 97% on RA during my visit.   CV:  Palpation - no murmur/non-displaced PMI, Auscultation - rate and rhythm regular II/VI systolic murmur. Apical HR 52 irregular.   VASCULAR: Trace edema bilateral lower extremities at ankle, wearing tubigrips.  ABDOMEN:  Rounded abd, normal bowel sounds, soft, non-distended, non-tender. No appreciable masses.  M/S:   Gait and station wheelchair bound. Spine is not " straight, scoliosis. Knees without tenderness to palpation or swelling.   SKIN:  Inspection - no visible rashes/lesions/uclerations. Palpation- no increased warmth, skin is dry and non-tender.  NEURO: cranial nerves II-XII grossly intact, no facial asymmetry, follows simple commands, moves all extremities symmetrically, normal tone, no tremor  PSYCH: awake and alert, speech fluent,  insight and judgement impaired, calm and cooperative w/ exam.    Labs:   PCC results reviewed:      Recent labs in [facility] reviewed by me today.    CBC RESULTS:   Recent Labs   Lab Test 03/08/19  0536 02/02/19  1433   WBC 8.6 5.0   RBC 3.68* 3.78*   HGB 12.6 13.2   HCT 37.6 39.2   * 104*   MCH 34.2* 34.9*   MCHC 33.5 33.7   RDW 13.9 13.9   PLT 69* 106*       Last Basic Metabolic Panel:  Recent Labs   Lab Test 03/08/19  0536 03/06/19  0720    138   POTASSIUM 3.8 4.0   CHLORIDE 107 105   RIK 8.2* 8.2*   CO2 28 25   BUN 25 21   CR 0.80 0.82   GLC 96 96     GFR Estimate   Date Value Ref Range Status   03/08/2019 61 >60 mL/min/[1.73_m2] Final     Estimated Creatinine Clearance: 35.6 mL/min (based on SCr of 0.8 mg/dL).      Liver Function Studies -   Recent Labs   Lab Test 02/04/19 02/02/19  1433   PROTTOTAL 6.7 6.3*   ALBUMIN 3.0* 2.6*   BILITOTAL 0.6 0.8   ALKPHOS 61 87   AST 13 25   ALT <9 15     TSH   Date Value Ref Range Status   02/02/2019 4.96 (H) 0.40 - 4.00 mU/L Final   12/07/2016 4.89 (H) 0.40 - 4.00 mU/L Final     Lab Results   Component Value Date    A1C 5.5 12/20/2013       ASSESSMENT/PLAN:  (J21.0) RSV bronchiolitis  (primary encounter diagnosis)  (J18.1) Pneumonia of right lower lobe due to infectious organism (H)  Comment: Stable, resident diagnosis with RSV and right lower lobe infiltrate.   Plan:   - For easy of administration for patient and nursing staff change antibiotic to Levaquin 750 mg PO q 48 hours (renally dosed) x 7 days  - Guaifenesin QID and Duonebs BID for symptoms  - Feeding assist and encourage PO  fluids  - q shift VS    (I50.42) Chronic combined systolic and diastolic congestive heart failure (H)  (I10) Essential hypertension, benign  (I48.0) PAF (paroxysmal atrial fibrillation) (H)  (N18.3) CKD stage III   Comment: Chronic cardiovascular and renal disease  Plan:  - Monitor VS as above  - Renally dose medications and avoid nephrotoxins    (G30.1,  F02.80) Late onset Alzheimer's disease without behavioral disturbance  Comment: Mental status at baseline. Overall slow progressive decline in function and cognition.  Plan:  - Continue supportive care in LTC environment   - Focus on comfort and QoL    Orders written by provider at facility.    Called daughterVivian, on home and cell, left message requesting return call.      Electronically signed by:  DEAN Purcell CNP

## 2019-03-11 PROBLEM — J18.9 SEPSIS DUE TO PNEUMONIA (H): Status: ACTIVE | Noted: 2019-01-01

## 2019-03-11 PROBLEM — A41.9 SEPSIS DUE TO PNEUMONIA (H): Status: ACTIVE | Noted: 2019-01-01

## 2019-03-11 PROBLEM — I48.91 ATRIAL FIBRILLATION WITH RVR (H): Status: ACTIVE | Noted: 2019-01-01

## 2019-03-11 NOTE — PROGRESS NOTES
[facility]  [place] Medical Record Number:  [id number]  Place of Service where encounter took place:  [facility] ([facility]) [[id number]]       Chief Complaint   Patient presents with     RECHECK       HPI:    Essie Jernigan  is a 89 year old (8/9/1920) female with PMH significant for alzheimer's dementia, CKD stage III, HFpEF, afib, constipation, hx of CVA (2013), who is being seen today for an episodic care visit.      HPI information obtained from: facility chart records, facility staff, patient report, [facility] chart review and family/first contact daughter, Vivian, report.     Resident of [facility] since 2012 after TCU stay for rehab after pelvic fracture. Non-ambulatory, but able to stand for transfers.     Follow-up today for respiratory illness.    Today's concern is:  Right lower lobe pneumonia   RSV bronchitis   One week history of cough. + RSV. Negative for influenza.  Mobile CXR showed right lower lobe infiltrate.   3/7 started on Augmentin and Doxycyline, but due to confusion at facility with double antibiotic therapy, changed to renally dosed Levaquin 750 mg PO q 48 hours first doses 3/8 and 3/10.   Tachypnea and rapid heart rate today.  Getting Duoneb BID and guaifenesin QID for symptom mgmt.   BUN (37) and WBC (15.6) elevated on labs.  Poor oral intake. Laying in bed.     Tachycardia  Afib  HFpEF  HTN  History of afib/aflutter, metoprolol stopped due to bradycardia.  Hx of HFpEF, last echo 12/19/13 showed EF 60-65%.  No CP or SOB. No dizziness. No leg swelling.  Not on any cardiovascular medications.    CKD stage III  Baseline Cr 0.8.  Cr today 1.1, BUN 37.   GFR Estimate   Date Value Ref Range Status   03/11/2019 42 (L) >60 mL/min/[1.73_m2] Final     Dementia  Mental status at baseline, asks me about the weather, always says she feels well.   Scored 16/30 in SLUMS in September 2013. Last BIMS 12/15 in November 2019.  Needs assistance with ADLs, mobility,  "safety, medications, and meals.  No longer ambulatory, but can stand to transfer. No recent falls.  Up in wheelchair t/o the day at baseline.   Daughter feels she is still enjoying life.   Her mood and behavior have been stable.    Recent VS in Epic reviewed:  BP Readings from Last 3 Encounters:   03/11/19 109/69   03/08/19 115/74   02/28/19 125/73     Pulse Readings from Last 4 Encounters:   03/11/19 121   03/08/19 92   02/28/19 90   02/01/19 85     Wt Readings from Last 5 Encounters:   03/11/19 57.5 kg (126 lb 11.2 oz)   03/08/19 57.5 kg (126 lb 11.2 oz)   02/28/19 57.8 kg (127 lb 6.4 oz)   02/01/19 58.3 kg (128 lb 9.6 oz)   01/14/19 58.9 kg (129 lb 12.8 oz)       Past Medical and Surgical History reviewed in Epic today.    MEDICATIONS:  Current Outpatient Medications   Medication Sig Dispense Refill     acetaminophen (TYLENOL) 325 MG tablet Take 650 mg by mouth every 6 hours as needed        AMOXICILLIN PO Give 2 grams by mouth 1 hour prior to dental appts       aspirin 81 MG tablet Take 81 mg by mouth daily        guaiFENesin (ROBITUSSIN) 100 MG/5ML SYRP Take 10 mLs by mouth 4 times daily       ipratropium - albuterol 0.5 mg/2.5 mg/3 mL (DUONEB) 0.5-2.5 (3) MG/3ML neb solution Take 1 vial by nebulization 2 times daily And bid prn       levofloxacin (LEVAQUIN) 750 MG tablet Take 750 mg by mouth every other day       senna-docusate (SENOKOT-S;PERICOLACE) 8.6-50 MG per tablet Take 2 tablets by mouth 2 times daily        VITAMIN D, CHOLECALCIFEROL, PO Take 2,000 Units by mouth daily       REVIEW OF SYSTEMS:  Limited secondary to cognitive impairment but today pt reports \"I feel fine!\"     Objective:  /69   Pulse 121   Temp 98.2  F (36.8  C)   Resp (!) 32   Ht 1.727 m (5' 8\")   Wt 57.5 kg (126 lb 11.2 oz)   SpO2 92%   BMI 19.26 kg/m    Exam:  GENERAL APPEARANCE:  Frail, fully dressed, sleeping in bed  EYES: lids normal, sclera clear and conjunctiva normal, no discharge, wearing glasses  ENT:  dry mouth " and tongue, poor dentition, nose without drainage   RESP:  Rapid RR - 32, palpation of chest normal, no chest wall tenderness, diffuse rhonchi, wet cough, patient is on room air 93% on RA during my visit, weak cough.  CV:  Palpation - no murmur/non-displaced PMI, Auscultation - irregular, apical HR 120s   VASCULAR: Trace edema bilateral lower extremities at ankle, wearing tubigrips socks.  ABDOMEN:  Rounded abd, normal bowel sounds, soft, non-distended, non-tender. No appreciable masses.  SKIN:  Inspection - no visible rashes/lesions/uclerations. Palpation- no increased warmth, skin is dry and non-tender.  NEURO: no facial asymmetry, follows simple commands, moves all extremities symmetrically, normal tone, no tremor  PSYCH: awake and alert, speech fluent,  insight and judgement impaired, cooperative w/ exam.    Labs:   Recent labs in [facility] reviewed by me today.    CBC RESULTS:   Recent Labs   Lab Test 03/11/19  0814 03/08/19  0536   WBC 15.9* 8.6   RBC 4.00 3.68*   HGB 13.9 12.6   HCT 41.6 37.6   * 102*   MCH 34.8* 34.2*   MCHC 33.4 33.5   RDW 14.1 13.9   PLT 94* 69*       Last Basic Metabolic Panel:  Recent Labs   Lab Test 03/11/19  0814 03/08/19  0536    141   POTASSIUM 4.0 3.8   CHLORIDE 102 107   RIK 8.7 8.2*   CO2 31 28   BUN 37* 25   CR 1.10* 0.80   GLC 78 96     Estimated Creatinine Clearance: 25.9 mL/min (A) (based on SCr of 1.1 mg/dL (H)).    Liver Function Studies -   Recent Labs   Lab Test 02/04/19 02/02/19  1433   PROTTOTAL 6.7 6.3*   ALBUMIN 3.0* 2.6*   BILITOTAL 0.6 0.8   ALKPHOS 61 87   AST 13 25   ALT <9 15     TSH   Date Value Ref Range Status   02/02/2019 4.96 (H) 0.40 - 4.00 mU/L Final   12/07/2016 4.89 (H) 0.40 - 4.00 mU/L Final     Lab Results   Component Value Date    A1C 5.5 12/20/2013     3/6/19 CXR:      ASSESSMENT/PLAN:  (J18.1) Pneumonia of right lower lobe due to infectious organism (H)  (primary encounter diagnosis)  (J20.5) RSV bronchitis  Comment: Deteriorating, 5 day  "history of RSV with secondary pneumonia (R LL). Today with elevated BUN, WBC, and RR after two doses of renally dosed Levaquin.  Plan:   - Discussed goals of care with patient and family, despite advanced age both pt/daughter would like reversible illness treated. Not unreasonable to give IV abx and monitor/treat cardiac complications in hospital setting.  - Discussed if no improvement in condition, consider palliative care/hospice and return to LTC facility.    (R00.0) Tachycardia  (I48.0) PAF (paroxysmal atrial fibrillation) (H)  Comment: Concern for atrial rhythm with RVR in setting of acute pneumonia. Duonebs could contribute, only using BID.   Plan:   - Tranfers to ER for cardiac monitoring and management     (I50.30) (HFpEF) heart failure with preserved ejection fraction (H)  (I10) Essential hypertension, benign  Comment: Concern for HF exacerbation in setting of rapid HR  Plan:   - Transfer to ER for eval and mgmt    (N18.3) Chronic kidney disease, stage III (moderate) (H)  Comment: BUN/Cr slightly elevated above baseline in setting acute illness and poor oral intake  Plan:   - May benefit from gentle IVF with close monitoring    (G30.1,  F02.80) Late onset Alzheimer's disease without behavioral disturbance  Comment: Mental status at baseline, more fatigued today, would like reversible illness treated, but no aggressive work-up of chronic conditions unless would not add to comfort  Plan:   - Risk/benefit of hospital discussed with pt/family, would like to \"give her ever chance\" to recovery from acute illness prior to thinking about full comfort care.  - POLST for selective treatment     Orders written by provider at facility and discussed with nursing.     Electronically signed by:  DEAN Purcell CNP         "

## 2019-03-11 NOTE — PHARMACY-ADMISSION MEDICATION HISTORY
Admission medication history interview status for the 3/11/2019  admission is complete. See EPIC admission navigator for prior to admission medications     Medication history source reliability:Good    Actions taken by pharmacist (provider contacted, etc):Contacted nursing home for MAR    Additional medication history information not noted on PTA med list :None    Medication reconciliation/reorder completed by provider prior to medication history? No    Time spent in this activity: 15 min    Prior to Admission medications    Medication Sig Last Dose Taking? Auth Provider   acetaminophen (TYLENOL) 325 MG tablet Take 650 mg by mouth every 6 hours as needed   Yes Reported, Patient   aspirin 81 MG tablet Take 81 mg by mouth daily  3/11/2019 at Unknown time Yes Reported, Patient   guaiFENesin (ROBITUSSIN) 100 MG/5ML SYRP Take 10 mLs by mouth 4 times daily For 7 days starting 3/6/19 3/11/2019 at 1000 Yes Reported, Patient   ipratropium - albuterol 0.5 mg/2.5 mg/3 mL (DUONEB) 0.5-2.5 (3) MG/3ML neb solution Take 1 vial by nebulization 2 times daily  prn Yes Reported, Patient   ipratropium - albuterol 0.5 mg/2.5 mg/3 mL (DUONEB) 0.5-2.5 (3) MG/3ML neb solution Take 1 vial by nebulization 2 times daily 3/11/2019 at Unknown time Yes Unknown, Entered By History   levofloxacin (LEVAQUIN) 750 MG tablet Take 750 mg by mouth every other day Started on 3/8/19. For 7 days for pneumonia. 3/10/2019 at 2000 Yes Reported, Patient   Nutritional Supplements (ENSURE PLUS PO) Take 8 oz by mouth 3 times daily (with meals) 3/11/2019 at Unknown time Yes Unknown, Entered By History   senna-docusate (SENOKOT-S;PERICOLACE) 8.6-50 MG per tablet Take 2 tablets by mouth 2 times daily  3/11/2019 at am Yes Reported, Patient   VITAMIN D, CHOLECALCIFEROL, PO Take 2,000 Units by mouth daily 3/11/2019 at Unknown time Yes Reported, Patient   AMOXICILLIN PO Give 2 grams by mouth 1 hour prior to dental appts   Reported, Patient

## 2019-03-11 NOTE — ED NOTES
Bed: ED03  Expected date:   Expected time:   Means of arrival:   Comments:  [id number] possible pneumonia

## 2019-03-11 NOTE — ED PROVIDER NOTES
History     Chief Complaint:  Cough    HPI   HPI limited secondary to patient's Alzheimer's. History is supplemented by EMS report and chart review.    Essie Jernigan is a 89 year old female with congestive heart failure, paroxysmal atrial fibrillation, and Alzheimer's disease who presents with a cough. The patient was found to have a developing right lower lobe pneumonia on 3/6/19 and was started on renally-dosed Levofloxacin thereafter (3/8/19-3/15/19 750 mg orally every other day). She was seen by Belkys MORAN CNP of geriatric services at her current residence, [facility], for follow-up today where she was found to have elevated BUN at 37, Creatinine at 1.10 and white blood cell count at 15.9. The patient was also found to have an atrial rhythm with RVR and did not seem to be improving, so she was sent to the ED for further evaluation. Here in the ED, the patient states she feels well. She has a cough, but denies any chest pain, shortness of breath, or chills.    3/6/19 Chest XR  1. Chronic lung findings with developing right lower lobe infiltrate. Follow-up recommended.  2. Tiny right pleural effusion is seen.  As read by Radiology.    Allergies:  No known drug allergies    Medications:    Tylenol  Aspirin 81 mg  Robitussin  Duoneb  Levofloxacin  Senokot    Past Medical History:    Late onset Alzheimer's disease  Physical deconditioning  Hypertension  Cerebrovascular accident  Basal cell carcinoma  Chronic combined systolic and diastolic congestive heart failure    Past Surgical History:    Appendectomy  Hysterectomy  Wrist surgery  Hip replacement    Family History:    History reviewed. No pertinent family history.     Social History:  Smoking status: No  Alcohol use: No  PCP: Belkys Fiore  Marital Status:  [5]     Review of Systems   Unable to perform ROS: Dementia     Physical Exam     Patient Vitals for the past 24 hrs:   BP Temp Temp src Pulse Heart Rate Resp SpO2  "Height Weight   03/11/19 2002 -- -- -- -- -- -- 98 % -- --   03/11/19 2000 113/64 -- -- 99 112 25 -- -- --   03/11/19 1945 112/58 -- -- -- 104 20 -- -- --   03/11/19 1930 109/66 -- -- 107 118 (!) 35 93 % -- --   03/11/19 1900 (!) 84/75 -- -- 112 121 16 -- -- --   03/11/19 1852 -- 98.5  F (36.9  C) Oral -- -- -- -- -- --   03/11/19 1845 92/55 -- -- 120 117 29 -- -- --   03/11/19 1830 (!) 108/96 -- -- 115 121 (!) 45 -- -- --   03/11/19 1815 114/73 -- -- 128 135 30 -- -- --   03/11/19 1800 107/75 -- -- 135 111 (!) 36 -- -- --   03/11/19 1745 95/76 -- -- 141 121 8 -- -- --   03/11/19 1617 -- -- -- -- -- -- -- 1.651 m (5' 5\") 56.7 kg (125 lb)   03/11/19 1600 107/69 -- -- 96 108 (!) 31 -- -- --   03/11/19 1545 94/58 -- -- -- 130 24 -- -- --   03/11/19 1530 90/59 -- -- -- 131 26 91 % -- --   03/11/19 1459 99/77 98.1  F (36.7  C) Oral -- 113 24 92 % -- --     Physical Exam  Physical Exam   General:  Lying on the bed by herself. Appears stated age.  HENT:  No obvious trauma to head  Right Ear:  External ear normal.   Left Ear:  External ear normal.   Nose:  Nose normal.   Eyes:  Conjunctivae and EOM are normal. Pupils are equal, round, and reactive.   Neck: Normal range of motion. Neck supple. No tracheal deviation present.   CV:  Normal heart sounds. No murmur heard.  Pulm/Chest: Coarse breath sounds in right lower long.  Abd: Soft. No distension. There is no tenderness. There is no rigidity, no rebound and no guarding.   M/S: Normal range of motion.   Neuro: Alert. Pleasantly confused.  Skin: Skin is warm and dry other than chronic venous stasis changes to the bilateral lower extremities. No rash noted. Not diaphoretic.   Psych: Normal mood and affect. Behavior is normal.     Emergency Department Course   ECG (14:59:01):  Rate 113 bpm. RI interval *. QRS duration 78. QT/QTc 334/458. P-R-T axes * 68 -67. Atrial fibrillation with rapid ventricular response. Septal infarct, age undetermined. Abnormal ECG. New atrial " fibrillation with RVR compared to 12/9/13. Interpreted at 1530 by Modesto Brandt DO.    Imaging:  Radiographic findings were communicated with the patient who voiced understanding of the findings.    Chest XR, PA, & LAT:  Probable right lower lobe pneumonia with parapneumonic effusion.   As read by Radiology.    Laboratory:  CBC: WBC 18.9 (H), PLT 82 (L), o/w WNL (HGB 13.3)  CMP: Glucose 113 (H), BUN 38 (H), Creatinine 1.06 (H), GFR 43 (L), Bilirubin total 1.5 (H), Albumin 2.2 (L), Protein total 6.4 (L), o/w WNL  Lactic acid (1613): 4.0 (HH)  Lactic acid (1842): 3.1 (H)  Blood cultures: In process  Catheterized UA: Ketones 5, protein albumin 30, urobilinogen 4.0 (H), moderate leukocyte esterase, WBC 6 (H), squamous epithelia 2 (H), mucous present, o/w negative    Interventions:  1558:  mL IV Bolus  1649: 1201 mL NS bolus IV  1649: 3.375 g Zosyn IV  1823: 500 mg Azithromycin IV  1823: 5 mg Diltiazem IV  1901: 125 mg Diltiazem IV drip at 2.5 mg/hour - paused at 1920 due to low blood pressure    Emergency Department Course:  The patient arrived in the emergency department via EMS.  Past medical records, nursing notes, and vitals reviewed.  1616: I performed an exam of the patient and obtained history, as documented above.  ECG performed, results above.  IV inserted and blood drawn. The patient was placed on continuous cardiac monitoring and pulse oximetry.  The patient was sent for a chest x-ray while in the emergency department, findings above.    1641: I discussed the patient's case with Dr. Dalton of the hospitalist service, who will admit the patient to a [facility] bed for further monitoring, evaluation, and treatment.     1655: I re-evaluated patient.    Discussed care with RN.    Updated hospitalist.    2020: I reevaluated the patient. Patient improving    Impression & Plan    CMS Diagnoses:   The patient has signs of Severe Sepsis as evidenced by:    1. 2 SIRS criteria, AND  2. Suspected infection,  AND   3. Organ dysfunction: Lactic Acid > 1.9    Time severe sepsis diagnosis confirmed = 1613 as this was the time when Lactate resulted, and the level was > 1.9    3 Hour Severe Sepsis Bundle Completion:  1. Initial Lactic Acid Result:   Recent Labs   Lab Test 03/11/19  1827 03/11/19  1553   LACT 3.1* 4.0*     2. Blood Cultures before Antibiotics: Yes  3. Broad Spectrum Antibiotics Administered: Yes     Anti-infectives (From now, onward)    Start     Dose/Rate Route Frequency Ordered Stop    03/11/19 1626  azithromycin (ZITHROMAX) 500 mg in sodium chloride 0.9 % 250 mL intermittent infusion      500 mg  over 1 Hours Intravenous ONCE 03/11/19 1625      03/11/19 1624  piperacillin-tazobactam (ZOSYN) 3.375 g vial to attach to  mL bag      3.375 g  over 1 Hours Intravenous ONCE 03/11/19 1625          4. 1701 ml of IV fluids.   Ideal body weight: 57 kg (125 lb 10.6 oz)    Severe Sepsis reassessment:  1. Repeat Lactic Acid Level: 3.1  2. MAP>65 after initial IVF bolus, will continue to monitor fluid status and vital signs  I attest to having performed a repeat sepsis exam and assessment of perfusion at 2020 and the results demonstrate improved perfusion.    Medical Decision Making:  Essie Jernigan is a very pleasant 89 year old female who presents to the ED for evaluation of worsening pneumonia.  The patient has been treated in the outpatient setting with Levaquin.  Her symptoms have persisted.  Her lactate is elevated.  A chest x-ray shows persistent/worsening of the pneumonia.  2 blood cultures were obtained.  She is started on Zosyn. The patient has evidence of severe sepsis with a lactate of 4.  She is found to have atrial fibrillation with a rapid ventricular rate.  She is provided the full 30 cc/kg fluid bolus.  The patient then required some diltiazem to reduce her heart rate for increased cardiac output.  She is provided a 5 mg bolus followed by a Cardizem drip that is titrated.  The patient will be  admitted to the hospital for continued evaluation and treatment.  I spoke to the hospitalist Dr. Dalton who has agreed to admit the patient for continued evaluation and treatment.    >>>Critical Care time:  Total critical care time exclusive of procedures was 35 minutes for this patient.<<<    Diagnosis:    ICD-10-CM    1. Severe sepsis (H) A41.9 Blood culture    R65.20 Blood culture     Lactic acid   2. Pneumonia due to infectious organism, unspecified laterality, unspecified part of lung J18.9    3. Atrial fibrillation with RVR (H) I48.91      Disposition: Admitted to [facility]    Tiffanie Lora  3/11/2019    EMERGENCY DEPARTMENT    ITiffanie, am serving as a scribe at 4:16 PM on 3/11/2019 to document services personally performed by Modesto Brandt DO based on my observations and the provider's statements to me.      Modesto Brandt DO  03/11/19 2021

## 2019-03-11 NOTE — H&P
Admitted:     03/11/2019      PRIMARY CARE PHYSICIAN:  Belkys MORAN, LJ.      CHIEF COMPLAINT:  Worsening pneumonia, atrial fibrillation.      HISTORY OF PRESENT ILLNESS:  History is limited from the patient due to her dementia.  The history was reviewed from the chart, ED report and her daughter, Vivian, over the phone.  Ms. Essie Jernigan is a pleasant 89-year-old female with dementia, history of paroxysmal atrial fibrillation and history of CVA, who resides at [facility] and was having some cough on 03/06/2019 and was noted with right lower lobe pneumonia, so she was started on renally adjusted levofloxacin.  Today her labs were abnormal including worsening renal function, leukocytosis and she was also noted with atrial fibrillation with RVR, so she was sent to ER for further evaluation.  The patient herself denies any active complaints and did not even know she was in the hospital.  She denies any chest pain or shortness of breath.  She denies pain in the abdomen.  She reports normal bowel and bladder habits.  Here in the ER, she was seen by Dr. Brandt and she was noted to be tachycardic.  Her lactate was elevated at 4.  She was given 2 liters of normal saline, was started on Zosyn and azithromycin and Hospitalist was requested for admission for further evaluation.      REVIEW OF SYSTEMS:  A limited 10-point review of system was done and was negative apart from those mentioned in the history of present illness.      PAST MEDICAL HISTORY:   1.  Alzheimer's dementia.   2.  Diastolic congestive heart failure.  Echo from 2013 showed an EF of 60-65%.   3.  Paroxysmal atrial fibrillation, not on anticoagulation.   4.  History of CVA in 2013.      MEDICATIONS PRIOR TO ADMISSION:  Medications Prior to Admission   Medication Sig Dispense Refill Last Dose     acetaminophen (TYLENOL) 325 MG tablet Take 650 mg by mouth every 6 hours as needed    Taking     aspirin 81 MG tablet Take 81 mg by mouth  daily    3/11/2019 at Unknown time     guaiFENesin (ROBITUSSIN) 100 MG/5ML SYRP Take 10 mLs by mouth 4 times daily For 7 days starting 3/6/19   3/11/2019 at 1000     ipratropium - albuterol 0.5 mg/2.5 mg/3 mL (DUONEB) 0.5-2.5 (3) MG/3ML neb solution Take 1 vial by nebulization 2 times daily    prn     ipratropium - albuterol 0.5 mg/2.5 mg/3 mL (DUONEB) 0.5-2.5 (3) MG/3ML neb solution Take 1 vial by nebulization 2 times daily   3/11/2019 at Unknown time     levofloxacin (LEVAQUIN) 750 MG tablet Take 750 mg by mouth every other day Started on 3/8/19. For 7 days for pneumonia.   3/10/2019 at 2000     Nutritional Supplements (ENSURE PLUS PO) Take 8 oz by mouth 3 times daily (with meals)   3/11/2019 at Unknown time     senna-docusate (SENOKOT-S;PERICOLACE) 8.6-50 MG per tablet Take 2 tablets by mouth 2 times daily    3/11/2019 at am     VITAMIN D, CHOLECALCIFEROL, PO Take 2,000 Units by mouth daily   3/11/2019 at Unknown time     AMOXICILLIN PO Give 2 grams by mouth 1 hour prior to dental appts   Taking         ALLERGIES:  NO KNOWN DRUG ALLERGIES.      SOCIAL HISTORY:  She resides at [facility].  No smoking, alcohol or illicit drug use.      FAMILY HISTORY:  Reviewed and not pertinent to current presentation.      PHYSICAL EXAMINATION:   GENERAL:  The patient is conscious, alert, awake, oriented to self only, confused with date and place, lying comfortably in bed in no apparent distress.   VITAL SIGNS:  Temperature 98.1, heart rate ranging from 100-140s, blood pressure 107/69, saturation 91% on room air.   HEENT:  Pupils are equal and reactive to light and accommodation.  Extraocular movements are intact.  Oral mucosa is dry.   NECK:  Supple, no raised JVD.   RESPIRATORY:  Lung sounds bilaterally clear to auscultation, no wheezes or crepitation.   CARDIOVASCULAR:  Normal S1 and S2, irregularly irregular rhythm, tachycardic, no murmur.   ABDOMEN:  Soft, nontender, nondistended, no guarding, rigidity or rebound  tenderness.   LOWER EXTREMITIES:  With no edema.   NEUROLOGIC:  No focal neurological deficits noted.  Cranial nerves II-XII grossly intact.  She follows commands.   PSYCHIATRIC:  Normal mood and affect.      LABORATORY DATA:  CMP notable for BUN of 38, creatinine of 1.06.  Lactate 4, blood glucose 113.  CBC with WBC of 18.9, hemoglobin 13.3, platelet 82.  UA mostly unremarkable except for moderate leukocyte esterase and 6 WBC.  Chest x-ray reviewed shows worsening right lower lobe pneumonia with parapneumonic effusion.  EKG reviewed by me shows AFib with RVR.      ASSESSMENT AND PLAN:  Ms. Essie Jernigan is a 89-year-old female with Alzheimer's dementia, diastolic CHF, history of CVA and paroxysmal atrial fibrillation, who was sent from [facility] for worsening pneumonia and AFib with RVR.      1.  Sepsis due to right lower lobe pneumonia.    - We will admit her as inpatient, continue azithromycin and Zosyn initiated in the ER, she got 2 liters of IV fluids in the ER.  Will continue with normal saline at 75 mL per hour.  We will repeat CBC and BMP in the a.m.  Currently, WBC is 18.9.     2.  Atrial fibrillation with RVR in the setting of a history of paroxysmal atrial fibrillation.    - Her AFib is probably aggravated by the sepsis.  We will start her on a diltiazem drip.  She used to be on Lopressor.  I am not sure why that was discontinued or if she is on it.  Pharmacy to verify the medication.  We will resume her PTA aspirin when verified by pharmacy.  I did discuss anticoagulation with her daughter.  Given her advanced dementia and advanced age, anticoagulation would be high risk.  She also has thrombocytopenia, so we decided against anticoagulation.  We will start her on Lopressor 25 mg po b.i.d. with hold parameters.     3.  Dementia.  Will get a  for disposition planning.     4.  Acute on chronic renal failure:  Her baseline creatinine is around 0.8-0.9, current creatinine is 1.06, likely  dehydrated and prerenal.  We will monitor BMP with IV hydration.     5.  Abnormal urinalysis.  Difficult to assess if she has any urinary symptoms.  We will add on a urine culture.     6.  CODE STATUS/ Goals of care:  She has advanced directives and she will be DNR/DNI.  This was confirmed with her daughter. Daughter wishes for comfort measures only if her condition were to worsen     7.  Deep venous prophylaxis:  Mechanical with PCD boots given her chronic thrombocytopenia.         RONALD JACINTO MD             D: 2019   T: 2019   MT:       Name:     ISRRAEL WHITING   MRN:      [id number]        Account:      [id number]   :      1920        Admitted:     2019                   Document: [id number]       cc: Belkys MORAN, CNP

## 2019-03-12 NOTE — PLAN OF CARE
Recd pt from station 88 for Afib RVR probably from sepsis due to RLL pna. Dilt gtt restarted at 5mg /hr BP at 100 systolic which  meet parameters. HR cont , unable to titrate dose due to BP. Pt asymptomatic. Pt has loose cough, unable to expectorate -no specimen obtained. 4 L N/C with sats's in the low 90's, difficult getting readings due to perfusion to fingers. Pt occ removes O2. IV flds but taking po well. Zosyn and Zithromax. Pt forgetful, pleasantly confused. Total care, incontinent. Needs assist with taking po fluids. Skin has multiple old scabs and skin tears on knees/ arms.  0720 Called daughter Vivian to update on change of rooms.

## 2019-03-12 NOTE — PLAN OF CARE
Pt forgetful and disoriented to time and place.  Total care.  Incontinent of BB sometimes.  Pt arrived from ED with Dilt drip paused for soft BP.  Restarted, paused throughout shift due to soft BPs.  Remained tachycardic.  Afib RVR.  Denied pain or discomfort.  Sent to CCU.

## 2019-03-12 NOTE — PROGRESS NOTES
[facility]    Medicine Progress Note - Hospitalist Service       Date of Admission:  3/11/2019  Assessment & Plan   Essie Jernigan is a 89-year-old female with Alzheimer's dementia, diastolic CHF, history of CVA and paroxysmal atrial fibrillation, who was sent from [facility] for worsening pneumonia and AFib with RVR.      Sepsis due to right lower lobe pneumonia  Acute hypoxic respiratory failure due to above   Recently diagnosed with pneumonia and [facility] but no improving as outpatient.  Initially treated with Levaquin at [facility].  On presentation met 3/4 SIRS criteria with tachycardia, tachypnea and leukocytosis (WBC 18.9).    Also hypoxic on presentation and requiring 4 L O2  - Continue azithromycin and Zosyn   - Follow cultures, NGTD  - Titrate O2 as tolerated     Atrial fibrillation with RVR   H/o PAF   Her AFib is probably aggravated by the sepsis/infection.  Was started on Diltiazem drip on presentation.  Today her heart rates have improved to the 70s-80s but continues to be in A fib.    - Continue PTA Lopressor.  Blood pressures have been on the lower side so holding off on uptitrating  - Stop Diltiazem drip  - PTA ASA  - If continues to have issues with RVR after stopping Diltiazem drip will consult cardiology given the low normal blood pressures   - Dr. Dalton did discuss anticoagulation with the patient's daughter on presentation and given her advanced dementia and advanced age, anticoagulation was felt to be too high of a risk    Dementia  Supposedly at baseline.    - Social work consulted for disposition planning     Elevated Cr in setting of CKD stage II   Baseline creatinine is around 0.8-0.9 and was 1.06 on admission.  Likely elevated due to dehydration.  Improved to 0.96 after IVF  - Discontinue IVF    Abnormal urinalysis  Does not have any urinary complaints on presentation or today.  Urine culture negative so far  - Continue to monitor UC         Diet: Combination Diet Regular  Diet Adult  Room Service    DVT Prophylaxis: Pneumatic Compression Devices  Cook Catheter: not present  Code Status: DNR/DNI      Disposition Plan   Expected discharge: 2 - 3 days, recommended to prior living arrangement once SIRS/Sepsis treated.  Entered: Michael Dumas DO 03/12/2019, 11:44 AM       The patient's care was discussed with the Bedside Nurse and Patient.    Michael Dumas DO  Hospitalist Service  [facility]    ______________________________________________________________________    Interval History   Patient seen and examined.  She is pleasantly demented.  No complaints at this time.  No pain.  No SOB while on 4 L of O2.    Data reviewed today: I reviewed all medications, new labs and imaging results over the last 24 hours. I personally reviewed no images or EKG's today.    Physical Exam   Vital Signs: Temp: 97.7  F (36.5  C) Temp src: Oral BP: 101/54 Pulse: 84 Heart Rate: 78 Resp: 24 SpO2: 93 % O2 Device: Nasal cannula Oxygen Delivery: 4 LPM  Weight: 128 lbs 11.98 oz  General Appearance: Resting comfortably.  NAD.  Pleasantly demented  Respiratory: Clear to auscultation.  No respiratory distress on 4 L via NC   Cardiovascular: Irregularly irregular.  Questionable systolic murmur  GI: Bowel sounds present.  No tenderness  Skin: No rashes.  No cyanosis  Other: No edema     Data   Recent Labs   Lab 03/12/19  0744 03/11/19  1553 03/11/19  0814   WBC 14.5* 18.9* 15.9*   HGB 12.0 13.3 13.9   * 103* 104*   PLT 73* 82* 94*    139 140   POTASSIUM 3.9 4.5 4.0   CHLORIDE 110* 105 102   CO2 26 27 31   BUN 33* 38* 37*   CR 0.96 1.06* 1.10*   ANIONGAP 8 7 7   RIK 7.5* 8.5 8.7   GLC 96 113* 78   ALBUMIN  --  2.2*  --    PROTTOTAL  --  6.4*  --    BILITOTAL  --  1.5*  --    ALKPHOS  --  74  --    ALT  --  15  --    AST  --  29  --      Recent Results (from the past 24 hour(s))   Chest XR,  PA & LAT    Narrative    CHEST TWO VIEWS 3/11/2019 4:35 PM     HISTORY: Shortness of  breath.    COMPARISON: 12/30/2009    FINDINGS: There is abnormal airspace opacity in the right hemithorax  with air bronchograms and associated with a pleural effusion. The left  lung appears clear. No pneumothorax either side.       Impression    IMPRESSION: Probable right lower lobe pneumonia with parapneumonic  effusion.     CANDICE RANKIN MD

## 2019-03-12 NOTE — ED NOTES
"[facility]  ED Nurse Handoff Report    ED Chief complaint: Cough (sent from from nursing home for pneumonia)      ED Diagnosis:   Final diagnoses:   Severe sepsis (H)   Pneumonia due to infectious organism, unspecified laterality, unspecified part of lung   Atrial fibrillation with RVR (H)       Code Status: Full Code    Allergies:   Allergies   Allergen Reactions     No Known Allergies        Activity level - Baseline/Home:  Total Care    Activity Level - Current:   Total Care     Needed?: No    Isolation: No  Infection: Not Applicable  Bariatric?: No    Vital Signs:   Vitals:    03/11/19 1830 03/11/19 1845 03/11/19 1852 03/11/19 1900   BP: (!) 108/96 92/55  (!) 84/75   Pulse: 115 120  112   Resp: (!) 45 29  16   Temp:   98.5  F (36.9  C)    TempSrc:   Oral    SpO2:       Weight:       Height:           Cardiac Rhythm: ,        Pain level: 0-10 Pain Scale: 0    Is this patient confused?: No   Does this patient have a guardian?  No         If yes, is there guardianship documents in the Epic \"Code/ACP\" activity?  N/A         Guardian Notified?  N/A  [place] - Suicide Severity Rating Scale Completed?  Yes  If yes, what color did the patient score?  White    Patient Report: Initial Complaint: Cough  Focused Assessment:   Neuro WDL, forgetfulness at times  Cardiac Tachy, low BP, on Diltiazem gtt  Resp Cough, SOB  Tests Performed:   Results for orders placed or performed during the hospital encounter of 03/11/19   Chest XR,  PA & LAT    Narrative    CHEST TWO VIEWS 3/11/2019 4:35 PM     HISTORY: Shortness of breath.    COMPARISON: 12/30/2009    FINDINGS: There is abnormal airspace opacity in the right hemithorax  with air bronchograms and associated with a pleural effusion. The left  lung appears clear. No pneumothorax either side.       Impression    IMPRESSION: Probable right lower lobe pneumonia with parapneumonic  effusion.     CANDICE RANKIN MD   UA with Microscopic   Result Value Ref " Range    Color Urine Dark Yellow     Appearance Urine Clear     Glucose Urine Negative NEG^Negative mg/dL    Bilirubin Urine Negative NEG^Negative    Ketones Urine 5 (A) NEG^Negative mg/dL    Specific Gravity Urine 1.025 1.003 - 1.035    Blood Urine Negative NEG^Negative    pH Urine 5.5 5.0 - 7.0 pH    Protein Albumin Urine 30 (A) NEG^Negative mg/dL    Urobilinogen mg/dL 4.0 (H) 0.0 - 2.0 mg/dL    Nitrite Urine Negative NEG^Negative    Leukocyte Esterase Urine Moderate (A) NEG^Negative    Source Catheterized Urine     WBC Urine 6 (H) 0 - 5 /HPF    RBC Urine 2 0 - 2 /HPF    Squamous Epithelial /HPF Urine 2 (H) 0 - 1 /HPF    Mucous Urine Present (A) NEG^Negative /LPF   Comprehensive metabolic panel   Result Value Ref Range    Sodium 139 133 - 144 mmol/L    Potassium 4.5 3.4 - 5.3 mmol/L    Chloride 105 94 - 109 mmol/L    Carbon Dioxide 27 20 - 32 mmol/L    Anion Gap 7 3 - 14 mmol/L    Glucose 113 (H) 70 - 99 mg/dL    Urea Nitrogen 38 (H) 7 - 30 mg/dL    Creatinine 1.06 (H) 0.52 - 1.04 mg/dL    GFR Estimate 43 (L) >60 mL/min/[1.73_m2]    GFR Estimate If Black 50 (L) >60 mL/min/[1.73_m2]    Calcium 8.5 8.5 - 10.1 mg/dL    Bilirubin Total 1.5 (H) 0.2 - 1.3 mg/dL    Albumin 2.2 (L) 3.4 - 5.0 g/dL    Protein Total 6.4 (L) 6.8 - 8.8 g/dL    Alkaline Phosphatase 74 40 - 150 U/L    ALT 15 0 - 50 U/L    AST 29 0 - 45 U/L   CBC with platelets + differential   Result Value Ref Range    WBC 18.9 (H) 4.0 - 11.0 10e9/L    RBC Count 3.76 (L) 3.8 - 5.2 10e12/L    Hemoglobin 13.3 11.7 - 15.7 g/dL    Hematocrit 38.8 35.0 - 47.0 %     (H) 78 - 100 fl    MCH 35.4 (H) 26.5 - 33.0 pg    MCHC 34.3 31.5 - 36.5 g/dL    RDW 14.2 10.0 - 15.0 %    Platelet Count 82 (L) 150 - 450 10e9/L    Diff Method Automated Method     % Neutrophils 83.4 %    % Lymphocytes 8.4 %    % Monocytes 7.4 %    % Eosinophils 0.0 %    % Basophils 0.2 %    % Immature Granulocytes 0.6 %    Nucleated RBCs 0 0 /100    Absolute Neutrophil 15.8 (H) 1.6 - 8.3 10e9/L     Absolute Lymphocytes 1.6 0.8 - 5.3 10e9/L    Absolute Monocytes 1.4 (H) 0.0 - 1.3 10e9/L    Absolute Eosinophils 0.0 0.0 - 0.7 10e9/L    Absolute Basophils 0.0 0.0 - 0.2 10e9/L    Abs Immature Granulocytes 0.1 0 - 0.4 10e9/L    Absolute Nucleated RBC 0.0    Lactic acid   Result Value Ref Range    Lactic Acid 4.0 (HH) 0.7 - 2.0 mmol/L   Lactic acid   Result Value Ref Range    Lactic Acid 3.1 (H) 0.7 - 2.0 mmol/L       Abnormal Results:   Abnormal Labs Reviewed   ROUTINE UA WITH MICROSCOPIC - Abnormal; Notable for the following components:       Result Value    Ketones Urine 5 (*)     Protein Albumin Urine 30 (*)     Urobilinogen mg/dL 4.0 (*)     Leukocyte Esterase Urine Moderate (*)     WBC Urine 6 (*)     Squamous Epithelial /HPF Urine 2 (*)     Mucous Urine Present (*)     All other components within normal limits   COMPREHENSIVE METABOLIC PANEL - Abnormal; Notable for the following components:    Glucose 113 (*)     Urea Nitrogen 38 (*)     Creatinine 1.06 (*)     GFR Estimate 43 (*)     GFR Estimate If Black 50 (*)     Bilirubin Total 1.5 (*)     Albumin 2.2 (*)     Protein Total 6.4 (*)     All other components within normal limits   CBC WITH PLATELETS DIFFERENTIAL - Abnormal; Notable for the following components:    WBC 18.9 (*)     RBC Count 3.76 (*)      (*)     MCH 35.4 (*)     Platelet Count 82 (*)     Absolute Neutrophil 15.8 (*)     Absolute Monocytes 1.4 (*)     All other components within normal limits   LACTIC ACID WHOLE BLOOD - Abnormal; Notable for the following components:    Lactic Acid 4.0 (*)     All other components within normal limits   LACTIC ACID WHOLE BLOOD - Abnormal; Notable for the following components:    Lactic Acid 3.1 (*)     All other components within normal limits     Treatments provided: IV LR, IV NS, IV Azithromycin, IV Diltazem    Family Comments:      OBS brochure/video discussed/provided to patient/family: N/A              Name of person given brochure if not patient:                 Relationship to patient:      ED Medications:   Medications   diltiazem (CARDIZEM) 125 mg in sodium chloride 0.9 % 125 mL infusion (0 mg/hr Intravenous Paused 3/11/19 1902)   0.9% sodium chloride BOLUS (0 mLs Intravenous Stopped 3/11/19 1648)   0.9% sodium chloride BOLUS (0 mL/kg × 56.7 kg Intravenous Stopped 3/11/19 1824)   piperacillin-tazobactam (ZOSYN) 3.375 g vial to attach to  mL bag (0 g Intravenous Stopped 3/11/19 1824)   azithromycin (ZITHROMAX) 500 mg in sodium chloride 0.9 % 250 mL intermittent infusion (500 mg Intravenous New Bag 3/11/19 1823)   diltiazem (CARDIZEM) injection 5 mg (5 mg Intravenous Given 3/11/19 1823)       Drips infusing?:  Yes    For the majority of the shift this patient was [place].   Interventions performed were NA.    Severe Sepsis OR Septic Shock Diagnosis Present:   Yes    Per the ED Provider, Time Zero for severe sepsis or septic shock is:  1613    3 Hour Severe Sepsis Bundle Completion:  1. Initial Lactic Acid Result:   Recent Labs   Lab Test 03/11/19 1827 03/11/19  1553   LACT 3.1* 4.0*     2. Blood Cultures before Antibiotics: Yes  3. Broad Spectrum Antibiotics Administered:     Anti-infectives (From now, onward)    None        4. 2L ml of IV fluids have been given so far      6 Hour Severe Sepsis Bundle Completion:    1. Repeat Lactic Acid Level:   Last result   Lab Results   Component Value Date    LACT 3.1 (H) 03/11/2019     2. Patient currently on Vasopressors =  No    To be done/followed up on inpatient unit:       ED NURSE PHONE NUMBER: *[id number]

## 2019-03-13 NOTE — PROGRESS NOTES
" 03/13/19 1400   Quick Adds   Type of Visit Initial PT Evaluation   Living Environment   Lives With alone   Living Arrangements extended care facility   Home Accessibility no concerns   Transportation Anticipated family or friend will provide   Living Environment Comment Pt lives at [facility]   Self-Care   Usual Activity Tolerance fair   Current Activity Tolerance poor   Regular Exercise No   Activity/Exercise/Self-Care Comment Notes indicate pt does foot propel in her w/c at times   Functional Level Prior   Ambulation 4-->completely dependent   Transferring 2-->assistive person   Toileting 3-->assistive equipment and person   Bathing 3-->assistive equipment and person   Fall history within last six months no   Prior Functional Level Comment Notes indicate pt is assist of 1-2 staff for transfers   General Information   Onset of Illness/Injury or Date of Surgery - Date 03/11/19   Referring Physician Michael Dumas, DO   Patient/Family Goals Statement \"Get some sleep\"   Pertinent History of Current Problem (include personal factors and/or comorbidities that impact the POC) 89 YOF admitted from her facility with worsening PNA, cough. Diagnosed with severe sepsis due to RLL PNA, a-fib with RVR. PMH: Alzheimer's dementia, CVA, PAF, CHF   Precautions/Limitations fall precautions;oxygen therapy device and L/min  (3L O2 NC)   Weight-Bearing Status - LUE full weight-bearing   Weight-Bearing Status - RUE full weight-bearing   Weight-Bearing Status - LLE full weight-bearing   Weight-Bearing Status - RLE full weight-bearing   General Observations Pleasant   General Info Comments Activity: up ad enoch   Cognitive Status Examination   Orientation person   Level of Consciousness alert;confused   Follows Commands and Answers Questions 75% of the time;able to follow single-step instructions   Personal Safety and Judgment impaired   Memory impaired   Cognitive Comment baseline dementia   Pain Assessment   Patient " "Currently in Pain No  (pt denies, no non-verbal indicators)   Range of Motion (ROM)   ROM Comment BLEs WFL   Strength   Strength Comments Pt moves BLEs spontaneously against gravity suggesting at least 3+/5 throughout   Bed Mobility   Bed Mobility Comments Rolling side to side with [place]. Pt declined sitting EOB despite encouragement   Transfer Skills   Transfer Comments Declined   Gait   Gait Comments NA   Coordination   Coordination Comments Manipulates covers easily   General Therapy Interventions   Planned Therapy Interventions balance training;bed mobility training;strengthening;transfer training   Clinical Impression   Criteria for Skilled Therapeutic Intervention yes, treatment indicated   PT Diagnosis weakness   Influenced by the following impairments fatigue, confusion, weakness   Functional limitations due to impairments bed mobility, transfers   Clinical Presentation Stable/Uncomplicated   Clinical Presentation Rationale see above   Clinical Decision Making (Complexity) Low complexity   Therapy Frequency` 3 times/week   Predicted Duration of Therapy Intervention (days/wks) 1 week   Anticipated Discharge Disposition Long Term Care Facility   Risk & Benefits of therapy have been explained Yes   Patient, Family & other staff in agreement with plan of care Yes   [facility] [employer] TM \"6 Clicks\"   2016, Trustees of [facility], under license to [employer].  All rights reserved.   6 Clicks Short Forms Basic Mobility Inpatient Short Form   [facility] [employer]  \"6 Clicks\" V.2 Basic Mobility Inpatient Short Form   1. Turning from your back to your side while in a flat bed without using bedrails? 3 - A Little   2. Moving from lying on your back to sitting on the side of a flat bed without using bedrails? 2 - A Lot   3. Moving to and from a bed to a chair (including a wheelchair)? 2 - A Lot   4. Standing up from a chair using your arms (e.g., wheelchair, or bedside chair)? 2 - A Lot   5. To walk in " hospital room? 1 - Total   6. Climbing 3-5 steps with a railing? 1 - Total   Basic Mobility Raw Score (Score out of 24.Lower scores equate to lower levels of function) 11   Total Evaluation Time   Total Evaluation Time (Minutes) 10

## 2019-03-13 NOTE — PLAN OF CARE
VSS, afib rate around 100 at rest. 3 L NC. W/C bound at baseline, total care, incontinent, baseline confusion is more tierd than usual per daughter. D1 with HT liquids new order per speech as pt was pocketing food this morning. ABX for pna, RT to see for pulmonary hygeine, patient is resistant to coughing/deep breathing and IS usage. CSC overflow, awaiting lift room. Transferring to 258-1, daughter Vivian to informed of room change

## 2019-03-13 NOTE — PLAN OF CARE
VSS. Monitor remains Atrial fib with CVR. Diltiazem drip discontinued per order. Pt. Denies pain. Continue IV antibiotics per order. Family member here and updated. Continue to monitor.

## 2019-03-13 NOTE — PLAN OF CARE
Discharge Planner SLP   Patient plan for discharge: She did not state.  Current status: Bedside swallow evaluation completed. Patient presents with moderate to severe oral and pharyngeal dysphagia at bedside. Patient last seen in 2013 with a video swallow study completed following her stroke. She demonstrated penetration of all liquids (thin, nectar and honey thick) a chin tuck reduced it to minimal to no penetration. However, she is no longer able to follow instructions to complete a chin tuck correctly. She demonstrated premature entry of thin liquids with a delayed swallow response and audible swallows. Suspect silent aspiration due to delay and delayed cough. Sx of aspiration with nectar thick liquids by spoon and especially by cup. Mildly tolerated honey thick liquids by spoon only. Minimal to mild oral residue with pureed textures that cleared with a liquid rinse by spoon. Mastication was insufficient for a solid with mild to moderate amount of oral residue. Patient is considered a high risk for aspiration with all liquids and solids. Recommend: 1. Downgrade diet to a DDL 1 with honey thick liquids by spoon only. 2. Feed only when fully alert and upright, liquids by spoon, verify each swallow, slow rate of feeding, alternate liquids/solids. If increased coughing, decreased respiratory status hold diet. Crush medications if able and place in apple sauce.   Barriers to return to prior living situation: None  Recommendations for discharge: LTC  Rationale for recommendations: May need short term ST needs at LTC if not at her baseline diet.        Entered by: Elda Price 03/13/2019 3:59 PM

## 2019-03-13 NOTE — PROGRESS NOTES
03/13/19 1531   General Information   Onset Date 03/11/19   Start of Care Date 03/13/19   Referring Physician Dr. Dumas   Patient Profile Review/OT: Additional Occupational Profile Info See Profile for full history and prior level of function   Patient/Family Goals Statement Patient wanted water.    Swallowing Evaluation Bedside swallow evaluation   Behaviorial Observations Lethargic   Mode of current nutrition Oral diet   Type of oral diet Regular;Thin liquid   Respiratory Status O2 Supply   Type of O2 supply Nasal cannula   Comments Essie Jernigan is a 89-year-old female with Alzheimer's dementia, diastolic CHF, history of CVA and paroxysmal atrial fibrillation, who was sent from [facility] for worsening pneumonia and AFib with RVR. Patient seen by this department in 2013 after her stroke and a video swallow study was completed. She demonstrated penetration with thin, nectar and honey thick liquids. A chin tuck was effective at that time and started on a DDL 2 with nectar and chin tuck.    Clinical Swallow Evaluation   Oral Musculature generally intact   Structural Abnormalities none present   Dentition upper dentures   Mucosal Quality dry  (xerostomia)   Mandibular Strength and Mobility impaired   Oral Labial Strength and Mobility impaired retraction;impaired pursing;impaired seal   Lingual Strength and Mobility impaired protrusion;impaired anterior elevation;impaired left lateral movement;impaired right lateral movement;impaired coordination   Velar Elevation impaired   Buccal Strength and Mobility impaired   Laryngeal Function Cough;Voicing initiated;Swallow   Oral Musculature Comments Moderate deficits.   Additional Documentation Yes   Swallow Eval   Feeding Assistance frequent cues/help required   Clinical Swallow Eval: Thin Liquid Texture Trial   Mode of Presentation, Thin Liquids cup;spoon;straw;self-fed;fed by clinician   Volume of Liquid or Food Presented 5 swallows   Oral Phase of Swallow Poor AP  movement;Premature pharyngeal entry   Oral Residue right lip drooling;left lip drooling  (Spillage from oral cavity.)   Pharyngeal Phase of Swallow impaired;reduction in laryngeal movement;no awareness of problems;wet vocal quality after swallow;coughing/choking  ( due to delayed cough. )   Diagnostic Statement Suspect silent aspiration via spoon, cup and straw due to delayed swallow and cough.    Clinical Swallow Eval: Nectar Thick Liquid Texture Trial   Mode of Presentation, Nectar cup;spoon;fed by clinician   Volume of Nectar Presented 5 swallows   Oral Phase, Nectar Poor AP movement;Premature pharyngeal entry   Pharyngeal Phase, Nectar impaired;reduction in laryngeal movement;repeated swallows;no awareness of problems   Diagnostic Statement Premature entry and loud audible swallow with nectar by the cup. Suspect silent aspiration.    Clinical Swallow Eval: Honey Thick Liquid Texture Trial   Mode of Presentation, Honey spoon;fed by clinician;cup   Volume of Honey Presented Apple juice   Oral Phase, Honey Poor AP movement;Premature pharyngeal entry   Pharyngeal Phase, Honey impaired;reduction in laryngeal movement;repeated swallows;no awareness of problems   Diagnostic Statement Delayed swallow with audible swallows via the cup.    Clinical Swallow Eval: Puree Solid Texture Trial   Mode of Presentation, Puree fed by clinician   Volume of Puree Presented Pudding and apple sauce   Oral Phase, Puree Poor AP movement;Residue in oral cavity;Premature pharyngeal entry   Oral Residue, Puree mid posterior tongue;left anterior lateral sulci   Pharyngeal Phase, Puree impaired;no awareness of problems;reduction in laryngeal movement;repeated swallows   Diagnostic Statement Minimal oral residue and suspect pharyngeal residue.    Clinical Swallow Eval: Solid Food Texture Trial   Mode of Presentation, Solid spoon;fed by clinician   Volume of Solid Food Presented Brownie and cracker   Oral Phase, Solid Poor AP movement;Residue  in oral cavity;Premature pharyngeal entry   Oral Residue, Solid mid posterior tongue;left anterior lateral sulci;right anterior lateral sulci;soft palate   Pharyngeal Phase, Solid impaired;reduction in laryngeal movement;repeated swallows;no awareness of problems   Diagnostic Statement Insufficient mastication,oral residue and suspect pharyngeal retention.    Swallow Compensations   Swallow Compensations Alternate viscosity of consistencies;Pacing;Reduce amounts;Multiple swallow   Results Suspect silent aspiration;Oral difficulties only   General Therapy Interventions   Planned Therapy Interventions Dysphagia Treatment   Dysphagia treatment Modified diet education;Instruction of safe swallow strategies   Swallow Eval: Clinical Impressions   Skilled Criteria for Therapy Intervention Skilled criteria met.  Treatment indicated.   Functional Assessment Scale (FAS) 2   Treatment Diagnosis Moderate to severe oral and pharyngeal dysphagia   Diet texture recommendations Dysphagia diet level 1;Honey thick liquids   Recommended Feeding/Eating Techniques alternate between small bites and sips of food/liquid;check mouth frequently for oral residue/pocketing;maintain upright posture during/after eating for 30 mins;no straws;small sips/bites  (Liquids by spoon)   Therapy Frequency 5 times/wk   Predicted Duration of Therapy Intervention (days/wks) 1 week   Anticipated Discharge Disposition extended care facility   Risks and Benefits of Treatment have been explained. Yes   Patient, family and/or staff in agreement with Plan of Care Yes   Clinical Impression Comments Patient presents with moderate to severe oral and pharyngeal dysphagia at bedside. Patient last seen in 2013 with a video swallow study completed following her stroke. She demonstrated penetration of all liquids (thin, nectar and honey thick) a chin tuck reduced it to minimal to no penetration. However, she is no longer able to follow instructions to complete a chin  tuck correctly. She demonstrated premature entry of thin liquids with a delayed swallow response and audible swallows. Suspect silent aspiration due to delay and delayed cough. Sx of aspiration with nectar thick liquids by spoon and especially by cup. Mildly tolerated honey thick liquids by spoon only. Minimal to mild oral residue with pureed textures that cleared with a liquid rinse by spoon. Mastication was insufficient for a solid with mild to moderate amount of oral residue. Patient is considered a high risk for aspiration with all liquids and solids. Recommend: 1. Downgrade diet to a DDL 1 with honey thick liquids by spoon only. 2. Feed only when fully alert and upright, liquids by spoon, verify each swallow, slow rate of feeding, alternate liquids/solids. If increased coughing, decreased respiratory status hold diet. Crush medications if able and place in apple sauce.    Total Evaluation Time   Total Evaluation Time (Minutes) 25

## 2019-03-13 NOTE — PROGRESS NOTES
[facility]    Medicine Progress Note - Hospitalist Service       Date of Admission:  3/11/2019  Assessment & Plan   Essie Jernigan is a 89-year-old female with Alzheimer's dementia, diastolic CHF, history of CVA and paroxysmal atrial fibrillation, who was sent from [facility] for worsening pneumonia and AFib with RVR.      Sepsis due to right lower lobe pneumonia  Acute hypoxic respiratory failure due to above   Recently diagnosed with pneumonia and [facility] but no improving as outpatient.  Initially treated with Levaquin at [facility].  On presentation met 3/4 SIRS criteria with tachycardia, tachypnea and leukocytosis (WBC 18.9).    Also hypoxic on presentation and requiring 4 L O2.  Have been able to down titrate to 3 L but patient is not cooperating with IS.  - Continue azithromycin and Zosyn   - Follow cultures, NGTD  - Titrate O2 as tolerated   - RCAT placed    Atrial fibrillation with RVR   H/o PAF   Her AFib is probably aggravated by the sepsis/infection.  Was started on Diltiazem drip on presentation.  Today her heart rates have improved to the 70s-80s but continues to be in A fib.  After stopping diltiazem drip rates came up to the 90s and low 100s  - Continue PTA Lopressor.  Blood pressures have been on the lower side so holding off on uptitrating  - PTA ASA  - Continue to monitor  - If we have issues with RVR again will consult cardiology given the low normal blood pressures   - Dr. Dalton did discuss anticoagulation with the patient's daughter on presentation and given her advanced dementia and advanced age, anticoagulation was felt to be too high of a risk    Dementia  Supposedly at baseline.    - Social work consulted for disposition planning     Elevated Cr in setting of CKD stage II   Baseline creatinine is around 0.8-0.9 and was 1.06 on admission.  Likely elevated due to dehydration.  Improved to 0.96 after IVF  - Discontinue IVF    Abnormal urinalysis  Does not have any urinary  complaints on presentation or today.  Urine culture negative so far  - Continue to monitor UC     Possible dysphagia  Nursing noticed that the patient was pocketing food today and some concerns for dysphagia  - Swallow evaluation ordered       Diet: Combination Diet Regular Diet Adult  Room Service    DVT Prophylaxis: Pneumatic Compression Devices  Cook Catheter: not present  Code Status: DNR/DNI      Disposition Plan   Expected discharge: 2 - 3 days, recommended to prior living arrangement once O2 use less than 1 liters/minute.  Entered: Michael Dumas DO 03/13/2019, 9:08 AM       The patient's care was discussed with the Bedside Nurse, Care Coordinator/ and Patient.    Michael Dumas DO  Hospitalist Service  [facility]    ______________________________________________________________________    Interval History   Patient seen and examined.  Resting in bed with no apparent distress.  No complaints of pain.  Does not feel short of breath.  Has not been cooperative with IS     Data reviewed today: I reviewed all medications, new labs and imaging results over the last 24 hours. I personally reviewed no images or EKG's today.    Physical Exam   Vital Signs: Temp: 98  F (36.7  C) Temp src: Oral BP: (!) 105/99 Pulse: 113 Heart Rate: 104 Resp: 20 SpO2: 96 % O2 Device: Nasal cannula Oxygen Delivery: 3 LPM  Weight: 135 lbs 9.33 oz  General Appearance: Pleasantly demented.  No apparent distress at this time and resting comfortably  Respiratory: Coarse breath sounds throughout.  No wheezing.  No respiratory distress on 3 L of O2  Cardiovascular: Irregularly irregular.  No obvious murmurs  GI: Bowel sounds present.  No tenderness  Skin: No rashes  Other: No edema.  No calf tenderness      Data   Recent Labs   Lab 03/12/19  0744 03/11/19  1553 03/11/19  0814   WBC 14.5* 18.9* 15.9*   HGB 12.0 13.3 13.9   * 103* 104*   PLT 73* 82* 94*    139 140   POTASSIUM 3.9 4.5 4.0    CHLORIDE 110* 105 102   CO2 26 27 31   BUN 33* 38* 37*   CR 0.96 1.06* 1.10*   ANIONGAP 8 7 7   RIK 7.5* 8.5 8.7   GLC 96 113* 78   ALBUMIN  --  2.2*  --    PROTTOTAL  --  6.4*  --    BILITOTAL  --  1.5*  --    ALKPHOS  --  74  --    ALT  --  15  --    AST  --  29  --      No results found for this or any previous visit (from the past 24 hour(s)).

## 2019-03-13 NOTE — PLAN OF CARE
PT: PT orders received, initial eval completed and treatment initiated. Patient lives in LTC at [facility]. She is a poor historian, oriented only to self. Notes indicate pt has assist for all ADLs, assist of 1-2 staff for transfers and is w/c dependent for mobility. Pt admitted with worsening PNA and cough. Diagnosed with sepsis due to RLL PNA, a-fib with RVR.    Discharge Planner PT   Patient plan for discharge: did not state  Current status: Pt was in bed upon PT arrival, sleeping but arouse easily to voice, pleasant. Pt requires Georgiana for roll side to side, declined trial of sitting at EOB despite encouragement. Pt did participate well with assisted supine LE exercises. HR fluctuated 108-126 with exercises, O2 sats 96% on 3L O2 NC, occasional non-productive cough.  Barriers to return to prior living situation: medical status, none to LTC  Recommendations for discharge: return to LTC  Rationale for recommendations: Anticipate pt will be able to return to [facility] once medical status improves       Entered by: Anastasia Jaime 03/13/2019 2:48 PM

## 2019-03-13 NOTE — PLAN OF CARE
Discharge Planner OT   Patient plan for discharge: N/A  Current status: Orders rec'd and chart reviewed. Ms. Essie Jernigan is a 89-year-old female with Alzheimer's dementia, diastolic CHF, history of CVA and paroxysmal atrial fibrillation, who was sent from [facility] for worsening pneumonia and AFib with RVR. Called [facility] and spoke with nurse who reports pt requires A of 1 to 2 if she's weaker for transfers to w/c and back and is w/c bound and at times will propel w/c on her own. Pt receives A of 1 for ADL's ie dressing, toileting, etc. Pt with baseline dementia at baseline with cognition. Pt appears to be at baseline with ADL's and no OT needs warranted. Will defer to PT for functional transfer assessment.   Barriers to return to prior living situation: none to return to [facility]  Recommendations for discharge: anticipate return to [facility] when medically stable.   Rationale for recommendations: Pt at baseline with ADL's, no OT needs, OT orders completed.        Entered by: Niurka Quintanilla 03/13/2019 11:51 AM

## 2019-03-13 NOTE — PLAN OF CARE
VSS. Attempted to wean patient O2 sats down, successfully got O2 down to 3L per NC. No complaints of pain. Turing patient q2 hours. Pt turns on own occasionally. Pt incontinent of urine. Skin fragile.

## 2019-03-14 PROBLEM — J18.9 PNEUMONIA: Status: ACTIVE | Noted: 2019-01-01

## 2019-03-14 PROBLEM — I48.0 PAF (PAROXYSMAL ATRIAL FIBRILLATION) (H): Status: RESOLVED | Noted: 2018-01-30 | Resolved: 2019-01-01

## 2019-03-14 NOTE — PLAN OF CARE
Physical Therapy Discharge Summary    Reason for therapy discharge:    Discharged to Return to LTC [facility]    Progress towards therapy goal(s). See goals on Care Plan in [facility] electronic health record for goal details.  Goals partially met.  Barriers to achieving goals:   discharge from facility.    Therapy recommendation(s):    No further therapy is recommended.

## 2019-03-14 NOTE — PROVIDER NOTIFICATION
MD Notification    Notified Person: MD    Notified Person Name: Dr. Gamble    Notification Date/Time: 3/14/19 @ 0552    Notification Interaction: paged physician    Purpose of Notification: Critical lactic acid : 2.2    Orders Received: no, continue to monitor. Receiving zosyn.     Comments:

## 2019-03-14 NOTE — DISCHARGE SUMMARY
[facility]    Discharge Summary  Hospitalist    Date of Admission:  3/11/2019  Date of Discharge:  3/14/2019  Discharging Provider: Humberto Singh MD    Discharge Diagnoses   Principal Problem:    Sepsis due to pneumonia (H)      Probable Aspiration Pneumonia -- RLL    Active Problems:    Essential hypertension, benign    Late onset Alzheimer's disease without behavioral disturbance    Atrial fibrillation with RVR (H)    Pneumonia -- RLL      History of Present Illness    89-year-old female with dementia, history of paroxysmal atrial fibrillation and history of CVA, who resides at [facility] and was having some cough on 03/06/2019 and was noted with right lower lobe pneumonia, so she was started on renally adjusted levofloxacin 3/11/19.  Her labs were abnormal including worsening renal function, leukocytosis and she was also noted with atrial fibrillation with RVR, so she was sent to ER for further evaluation.  The patient herself denies any active complaints but is pleasantly confused.  She denies any chest pain or shortness of breath.  She denies pain in the abdomen.  She reports normal bowel and bladder habits.  Here in the ER she was in afib with rate up to 140.  Her lactate was elevated at 4.0, WBC 18.9, creat 1.06, and Albumin 2.2.  CXR with RLL and parapneumonic effusion.  She was given 2 liters of normal saline, was started on Zosyn and azithromycin and Hospitalist was requested for admission for further evaluation.     Hospital Course   Admitted to cardiology telemetry floor, initially given IV Diltiazem, the Metoprolol 12.5 mg bid and heart rate improved as pneumonia treated.  Continued on IV Zosyn and Azithromycin and by time of discharge her WBC was done to 10.8, breathing improved with O2 down to 2 LPM and will continue to wean O2 as pneumonia improves.  Patient has had dysphagia since 2013 and on modified diet, and will continue Honey thick liquids and pureed diet.       Will continue with Azithromycin 350 mg PO daily for 2 mores days, and Ceftin 500 mg bid for 7 days, and patient will return to [facility].  Patient remains pleasant, but is confused, Ox1 and did not know who the president was.      Humberto Singh MD  Pager: [phone]  Cell Phone:  [phone]       Significant Results and Procedures   As above    Pending Results   These results will be followed up by Dr. Singh  Unresulted Labs Ordered in the Past 30 Days of this Admission     Date and Time Order Name Status Description    3/11/2019 1609 Blood culture Preliminary     3/11/2019 1609 Blood culture Preliminary           Code Status   DNR / DNI       Primary Care Physician   Belkys Fiore    Physical Exam   Temp: 97.1  F (36.2  C) Temp src: Axillary BP: 102/89 Pulse: 112 Heart Rate: 104 Resp: 22 SpO2: 94 % O2 Device: Nasal cannula Oxygen Delivery: 2 LPM  Vitals:    03/12/19 0600 03/13/19 0252 03/14/19 0615   Weight: 58.4 kg (128 lb 12 oz) 61.5 kg (135 lb 9.3 oz) 61.4 kg (135 lb 5.8 oz)     Vital Signs with Ranges  Temp:  [96.8  F (36  C)-98.1  F (36.7  C)] 97.1  F (36.2  C)  Pulse:  [112] 112  Heart Rate:  [] 104  Resp:  [13-27] 22  BP: ()/(60-96) 102/89  SpO2:  [94 %-98 %] 94 %  I/O last 3 completed shifts:  In: 600 [P.O.:600]  Out: -     Exam on discharge:   Lungs clear  CV with irregular S1S2  Neuro -- alert, Ox1, pleasant but did not know who the president was  Does have acrocyanosis with dusky cold feet related to poor circulation, but no ischemic pain.      # Discharge Pain Plan:   - Patient currently has NO PAIN and is not being prescribed pain medications on discharge.      Discharge Disposition   Discharged to nursing home  Condition at discharge: Fair    Consultations This Hospital Stay   SOCIAL WORK IP CONSULT  PHYSICAL THERAPY ADULT IP CONSULT  OCCUPATIONAL THERAPY ADULT IP CONSULT  SWALLOW EVAL SPEECH PATH AT BEDSIDE IP CONSULT    Time Spent on this  Encounter   I spent a total of 35 minutes discharging this patient.     Discharge Orders      General info for SNF    Length of Stay Estimate: Long Term Care  Condition at Discharge: Improving  Level of care:skilled   Rehabilitation Potential: Fair  Admission H&P remains valid and up-to-date: Yes  Recent Chemotherapy: N/A  Use Nursing Home Standing Orders: Yes     Mantoux instructions    Give two-step Mantoux (PPD) Per Facility Policy No (if no explain) -- just there.     Reason for your hospital stay    Aspiration pneumonia.     Additional Discharge Instructions    Call Dr. Wayne at Pager [phone] if questions, or Cell Phone [phone].     Follow Up and recommended labs and tests    Follow up with Nursing home physician, in 1 week, wean O2 as able to keep O2 sat > 90%.     Activity - Up with nursing assistance     DNR/DNI     Oxygen - Nasal cannula    2 Lpm by nasal cannula to keep O2 sats 90% or greater.     Advance Diet as Tolerated    Follow this diet upon discharge: Orders Placed This Encounter      Room Service      Combination Diet Dysphagia Diet Level 1: Pureed; Honey Thickened Liquids (pre-thickened or use instant food thickener) (by spoon only)     Discharge Medications   Current Discharge Medication List      START taking these medications    Details   azithromycin (ZITHROMAX) 250 MG tablet Take 1 tablet (250 mg) by mouth daily for 2 days  Qty: 4 tablet, Refills: 0    Associated Diagnoses: Pneumonia due to infectious organism, unspecified laterality, unspecified part of lung      cefuroxime (CEFTIN) 500 MG tablet Take 1 tablet (500 mg) by mouth 2 times daily for 7 days  Qty: 14 tablet, Refills: 0    Associated Diagnoses: Pneumonia due to infectious organism, unspecified laterality, unspecified part of lung      metoprolol tartrate (LOPRESSOR) 25 MG tablet Take 0.5 tablets (12.5 mg) by mouth 2 times daily    Associated Diagnoses: Atrial fibrillation with RVR (H)         CONTINUE these medications  which have CHANGED    Details   guaiFENesin (ROBITUSSIN) 100 MG/5ML SYRP Take 10 mLs by mouth every 4 hours as needed for cough For 7 days starting 3/6/19    Associated Diagnoses: Pneumonia due to infectious organism, unspecified laterality, unspecified part of lung      !! ipratropium - albuterol 0.5 mg/2.5 mg/3 mL (DUONEB) 0.5-2.5 (3) MG/3ML neb solution Take 1 vial (3 mLs) by nebulization every 4 hours as needed for wheezing or shortness of breath / dyspnea    Associated Diagnoses: Pneumonia due to infectious organism, unspecified laterality, unspecified part of lung       !! - Potential duplicate medications found. Please discuss with provider.      CONTINUE these medications which have NOT CHANGED    Details   acetaminophen (TYLENOL) 325 MG tablet Take 650 mg by mouth every 6 hours as needed       aspirin 81 MG tablet Take 81 mg by mouth daily       !! ipratropium - albuterol 0.5 mg/2.5 mg/3 mL (DUONEB) 0.5-2.5 (3) MG/3ML neb solution Take 1 vial by nebulization 2 times daily       Nutritional Supplements (ENSURE PLUS PO) Take 8 oz by mouth 3 times daily (with meals)      senna-docusate (SENOKOT-S;PERICOLACE) 8.6-50 MG per tablet Take 2 tablets by mouth 2 times daily       VITAMIN D, CHOLECALCIFEROL, PO Take 2,000 Units by mouth daily      AMOXICILLIN PO Give 2 grams by mouth 1 hour prior to dental appts       !! - Potential duplicate medications found. Please discuss with provider.      STOP taking these medications       levofloxacin (LEVAQUIN) 750 MG tablet Comments:   Reason for Stopping:             Allergies   Allergies   Allergen Reactions     No Known Allergies      Data   Most Recent 3 CBC's:  Recent Labs   Lab Test 03/14/19  0544 03/12/19  0744 03/11/19  1553   WBC 10.8 14.5* 18.9*   HGB 13.6 12.0 13.3   * 104* 103*   PLT 73* 73* 82*      Most Recent 3 BMP's:  Recent Labs   Lab Test 03/14/19  0544 03/12/19  0744 03/11/19  1553    144 139   POTASSIUM 4.2 3.9 4.5   CHLORIDE 109 110* 105    CO2 26 26 27   BUN 43* 33* 38*   CR 1.05* 0.96 1.06*   ANIONGAP 6 8 7   RIK 8.2* 7.5* 8.5   GLC 94 96 113*     Most Recent 2 LFT's:  Recent Labs   Lab Test 03/11/19  1553 02/04/19   AST 29 13   ALT 15 <9   ALKPHOS 74 61   BILITOTAL 1.5* 0.6     Most Recent INR's and Anticoagulation Dosing History:  Anticoagulation Dose History     Recent Dosing and Labs Latest Ref Rng & Units 9/30/2005 4/6/2007 4/7/2007 4/8/2007 4/9/2007 12/19/2013    INR 0.86 - 1.14 1.07 1.08 1.21(H) 1.39(H) 1.16(H) 1.05        Most Recent 3 Troponin's:  Recent Labs   Lab Test 12/21/13  0001 12/20/13  1810 12/20/13  1500   TROPI 0.067* 0.078* 0.079*     Most Recent Cholesterol Panel:  Recent Labs   Lab Test 12/08/15   CHOL 109*   LDL 40   HDL 61   TRIG 41     Most Recent 6 Bacteria Isolates From Any Culture (See EPIC Reports for Culture Details):  Recent Labs   Lab Test 03/11/19  1700 03/11/19  1553 03/11/19  1455   CULT No growth after 3 days No growth after 3 days No growth     Most Recent TSH, T4 and A1c Labs:  Recent Labs   Lab Test 02/02/19  1433  12/20/13  0916   TSH 4.96*   < >  --    A1C  --   --  5.5    < > = values in this interval not displayed.

## 2019-03-14 NOTE — PROGRESS NOTES
[facility]    Sepsis Evaluation Progress Note    Date of Service: 03/14/2019    I was called to see Essie Jernigan due to abnormal vital signs triggering the Sepsis SIRS screening alert. She is known to have an infection.     Physical Exam    Vital Signs:  Temp: 97.1  F (36.2  C) Temp src: Axillary BP: (!) 113/94   Heart Rate: 104 Resp: 22 SpO2: 94 % O2 Device: Nasal cannula Oxygen Delivery: 2 LPM    Lab:  Lactic Acid   Date Value Ref Range Status   03/11/2019 3.1 (H) 0.7 - 2.0 mmol/L Final     Lactate for Sepsis Protocol   Date Value Ref Range Status   03/14/2019 2.2 (H) 0.7 - 2.0 mmol/L Final     Comment:     Significant value called to and read back by  TIFFANY APONTE IN [facility] AT 0551 KMJ         The patient is at baseline mental status.      Assessment and Plan    The SIRS and exam findings are likely due to   sepsis.     ID: The patient is currently on the following antibiotics:  Anti-infectives (From now, onward)    Start     Dose/Rate Route Frequency Ordered Stop    03/12/19 1800  azithromycin (ZITHROMAX) 250 mg in sodium chloride 0.9 % 250 mL intermittent infusion      250 mg  over 1 Hours Intravenous EVERY 24 HOURS 03/11/19 2123 03/16/19 1759    03/11/19 2300  piperacillin-tazobactam (ZOSYN) 3.375 g vial to attach to  mL bag      3.375 g  over 1 Hours Intravenous EVERY 6 HOURS 03/11/19 2123          Current antibiotic coverage is appropriate for source of infection.    Fluid: Fluid bolus not indicated due to stable vitals, mild elevation, no change in status.    Lab: Repeat lactic acid is not indicated.    Disposition: The patient will remain on the current unit. We will continue to monitor this patient closely.    Csome Gamble DO

## 2019-03-14 NOTE — PLAN OF CARE
Speech Language Therapy Discharge Summary    Reason for therapy discharge:    Discharged to long term care facility.    Progress towards therapy goal(s). See goals on Care Plan in [facility] electronic health record for goal details.  Goals partially met.  Barriers to achieving goals:   discharge from facility.    Therapy recommendation(s):    Continued therapy is recommended.  Rationale/Recommendations:  SLP at next level of care for management of dysphagia given pt is below baseline.    At time of discharge - Recommend: 1. Downgrade diet to a DDL 1 with honey thick liquids by spoon only. 2. Feed only when fully alert and upright, liquids by spoon, verify each swallow, slow rate of feeding, alternate liquids/solids. If increased coughing, decreased respiratory status hold diet. Crush medications if able and place in apple sauce.

## 2019-03-14 NOTE — PLAN OF CARE
Vitals stable, tele a-fib 90-110s and MD aware, improved since AM.  Pt reports feeling better, frequent repositioning, incontinence cares done and barrier applied, purple cool feet and MD aware- pt denies numbness or pain.  Attempted room air, but oxygen was 88%, currently 94% on 2L NC.  Ate small amounts of breakfast and lunch.  MD will update daughter on plans to transfer back to facility.  Pt left via [facility] on stretcher with belongings and oxygen.  IV and tele removed.  Packet sent with to take to facility.

## 2019-03-14 NOTE — PROGRESS NOTES
"[facility]  PRIMARY CARE PROVIDER AND CLINIC:  Belkys Fiore, APRN CNP, [address] / [place] [address]  Chief Complaint   Patient presents with     Hospital F/U     [place] [facility] Record Number:  [id number]  Place of Service where encounter took place:  [facility] ([facility] [[id number]]    Essie Jernigan  is a 89 year old  (8/9/1920) female with PMH significant for PAF, hx of CVA, dementia  admitted to the above facility from  [facility]. Hospital stay 3/11/19 through 3/14/19..      Admitted to this facility for  rehab, medical management and nursing care.    HPI:    HPI information obtained from: facility chart records, facility staff, patient report and [facility] chart review.   Brief Summary of Hospital Course:   Transferred to hospital on 3/11 due to worsening renal function and leukocytosis on labs despite oral Levaquin for R LL s/p RSV bronchitis, along with concern for afib with RVR with HR in 140s, concern for sepsis. CXR on hospital admission also showed R LL and parapneumonic effusion. Treated with IVF and Zosyn and azithromycin in ER. Required supplemental oxygen. Rapid HR treated with IV diltiazem and then transitioned to metoprolol 12.5 mg PO BID; beta-blocker previously discontinued due to bradycardia. Continued on IV Zosyn and Azithromycin. Blood cultures negative. Leukocytosis resolved and weaned to room air. Discharged on Azithromycin 250 mg for two mores days, along with a seven day course of Ceftin. During hospital stay found to have dysphagia and was placed on honey thick liquids and pureed diet.      Updates on Status Since Skilled nursing Admission:   R LL Pneumonia  Reports she is \"tired\".  No SOB. + weak cough. No hypoxia or fever.  Tolerating oral antibiotics.  Working with PT.  Poor oral intake with thicken liquids. Ate 25% of breakfast, request water during visit, but needs to be spoon fed thickened water.  WBC on " hospital discharge 10.8, down from 18.9.    Afib w/ RVR  No dizziness, palpitations, or chest pain reported.  HR controlled on beta-blocker.  Continues on ASA 81 mg daily.  No anticoagulation due to advanced age, frailty, and fall risk.    HR in PCC reviewed:        HFpEF  HTN  Last echocardiogram in 12/19/13 and showed EF 60-65%.  No CP or SOB. No leg swelling.  Weight is stable.  Blood pressure low normal with addition of metoprolol.     Blood pressures in PCC reviewed:      CKD stage III   Baseline Cr ~ 1.   Lab Results   Component Value Date    CR 1.05 03/14/2019     GFR Estimate   Date Value Ref Range Status   03/14/2019 44 (L) >60 mL/min/[1.73_m2] Final     Dementia  Hx of CVA  R frontal lobe CVA in December 2013 and was started on full strength ASA and Statin.  Currently only on baby aspirin. Statin stopped due to age and goals of care.  Scored 16/30 in [facility] in September 2013.  Last BIMS 12/15 in November 2019.  Has resided on LTC unit since 2012 after TCU stay for rehab after pelvic fracture.  Needs assistance with ADLs, mobility, safety, medications, and meals.  Prior to hospital was not ambulatory, but could stand to transfer. No recent falls.  Her mood and behavior have been stable.  Working with PT today after my visit - up to wheelchair with pivot transfer.    CODE STATUS/ADVANCE DIRECTIVES DISCUSSION:   DNR / DNI  Patient's living condition: lives in a skilled nursing facility     ALLERGIES: No known allergies     PAST MEDICAL HISTORY:  has a past medical history of Irregular heart beat and Pelvic fracture (H) (11/14/12).     PAST SURGICAL HISTORY:   has a past surgical history that includes GYN surgery (hyst); orthopedic surgery (wrist, hip replacement); and appendectomy.     FAMILY HISTORY: family history is not on file.     SOCIAL HISTORY:   reports that  has never smoked. she has never used smokeless tobacco. She reports that she does not drink alcohol.   - One daughter, Vivian is HCA    Post  "Discharge Medication Reconciliation Status: discharge medications reconciled and changed, per note/orders (see AVS)    Current Outpatient Medications   Medication Sig Dispense Refill     acetaminophen (TYLENOL) 325 MG tablet Take 650 mg by mouth every 6 hours as needed        AMOXICILLIN PO Give 2 grams by mouth 1 hour prior to dental appts       aspirin 81 MG tablet Take 81 mg by mouth daily        cefuroxime (CEFTIN) 500 MG tablet Take 500 mg by mouth 2 times daily       metoprolol tartrate (LOPRESSOR) 25 MG tablet Take 0.5 tablets (12.5 mg) by mouth 2 times daily       Nutritional Supplements (ENSURE PLUS PO) Take 8 oz by mouth 3 times daily (with meals)       senna-docusate (SENOKOT-S;PERICOLACE) 8.6-50 MG per tablet Take 2 tablets by mouth 2 times daily        VITAMIN D, CHOLECALCIFEROL, PO Take 2,000 Units by mouth daily         ROS:  Limited secondary to cognitive impairment but today pt reports fatigue.     Vitals:  BP 90/54   Pulse 113   Temp 97.9  F (36.6  C)   Resp 26   Ht 1.651 m (5' 5\")   Wt 57.5 kg (126 lb 11.2 oz)   SpO2 93%   BMI 21.08 kg/m    Exam:  GENERAL APPEARANCE:  NAD, thin, older women, fully dressed, awake and resting in bed on her side  EYES: lids normal, sclera clear and conjunctiva normal, no discharge  ENT:  dry mouth and tongue, poor dentition, nose without drainage   RESP:  Non-labored breathing, palpation of chest normal, no chest wall tenderness, diffuse rhonchi that clear with cough, cough is weak.  CV:  Palpation - no murmur/non-displaced PMI, Auscultation - HR is irregular, ~ 60 bpm  VASCULAR: Trace edema bilateral lower extremities at ankle, feet are cool and even temp.  ABDOMEN:  Rounded abd, normal bowel sounds, soft, non-distended, non-tender. No appreciable masses.  MS: Needs full assist of psychical therapist for standing pivot transfer to wheelchair  SKIN:  Inspection - no visible rashes/lesions/uclerations. Palpation- no increased warmth, skin is dry and " non-tender.  NEURO: no facial asymmetry, follows simple commands, moves all extremities symmetrically, normal tone, no tremor  PSYCH: awake and alert, speech fluent,  insight and judgement impaired, cooperative w/ exam.    Lab/Diagnostic data:  Recent labs in [facility] reviewed by me today.   CBC RESULTS:   Recent Labs   Lab Test 03/14/19  0544 03/12/19  0744   WBC 10.8 14.5*   RBC 3.88 3.46*   HGB 13.6 12.0   HCT 39.9 35.8   * 104*   MCH 35.1* 34.7*   MCHC 34.1 33.5   RDW 14.4 14.3   PLT 73* 73*       Last Basic Metabolic Panel:  Recent Labs   Lab Test 03/14/19  0544 03/12/19  0744    144   POTASSIUM 4.2 3.9   CHLORIDE 109 110*   RIK 8.2* 7.5*   CO2 26 26   BUN 43* 33*   CR 1.05* 0.96   GLC 94 96     GFR Estimate   Date Value Ref Range Status   03/14/2019 44 (L) >60 mL/min/[1.73_m2] Final       Liver Function Studies -   Recent Labs   Lab Test 03/11/19  1553 02/04/19   PROTTOTAL 6.4* 6.7   ALBUMIN 2.2* 3.0*   BILITOTAL 1.5* 0.6   ALKPHOS 74 61   AST 29 13   ALT 15 <9       TSH   Date Value Ref Range Status   02/02/2019 4.96 (H) 0.40 - 4.00 mU/L Final   12/07/2016 4.89 (H) 0.40 - 4.00 mU/L Final     Lab Results   Component Value Date    A1C 5.5 12/20/2013     ASSESSMENT/PLAN:  (J18.1) Pneumonia of right lower lobe due to infectious organism (H)  (primary encounter diagnosis)  (R13.10) Dysphagia  Comment: Stable after hospitalization for R LL pneumonia with sepsis after RSV bronchitis improved with IV abx and fluids. Noted to have dysphagia in hospital now on modified diet with poor intake. Due to advanced age high risk for slow recovery.  Plan:  - Complete abx: 2 days Azithromycin and 7 days ceftin   - CBC on 3/18  - SLP following  - PT following  - Staff to provide feeding assistance and OOB for meals  - Dietician consult  - Spood feed thicken fluids 240 mL PO QID bwt meals    (I48.91) Atrial fibrillation with RVR (H)  Comment: rate HR with sepsis, improved with addition of beta-blocker, which was  previously stopped due to bradycardia  Plan:   - Continue metoprolol 12.5 mg PO BID  - Add hold parameter to metoprolol - if HR < 55 or SBP < 90    (I50.30) (HFpEF) heart failure with preserved ejection fraction (H)  (I10) Hypertension  Comment: Appears compensated, but BP low normal  Plan:   - Monitor weight, labs and vitals  - Encourage PO fluids    (N18.3) Chronic kidney disease, stage III (moderate) (H)  Comment: Baseline Cr ~ 1  Plan:   - Check BMP 3/18  - Renally dose medications and avoid nephrotoxins    (G30.1,  F02.80) Late onset Alzheimer's disease without behavioral disturbance  (Z86.73) Hx of CVA  Comment: Slow progressive decline in function and cognition, increased debility after hospitalization for pneumonia. No change in mood/behavior.  Plan:  - Continue supportive care in LTC environment, to this point daughter has wanted reversible illness treated if meaningful hope of recovery, but may consider hospice care if prognosis is guarded.   - Focus on comfort and QoL  - Continue Tylenol in case of pain  - Continue ASA 81 mg for secondary prevention    Spoke to daughterVivian, via telephone to update on clinical status and plan of care.    Electronically signed by:  DEAN Purcell CNP

## 2019-03-14 NOTE — PLAN OF CARE
Pt transferred from CCU to [facility] around 2300. Alert and oriented to self only. Denies any pain/sob. VSS on 2L NC. Tele A-fib, CVR/RVR. Pt is total care, turn and repo q2h. Incontinent of urine. DD1, honey thick diet. IV abx for pneumonia. Will continue to monitor.

## 2019-03-15 NOTE — PROGRESS NOTES
SW:  D:  Received discharge orders for patient.  Bed available at [facility] for today.  Call placed to update patient's daughter as to patient's status and to inquire about transportation options.  Patient's daughter is asking for transport to be arranged.  Spoke with patient's nurse who feels that stretcher transport would be best as patient is lethargic and she is not safe to sit up in a chair alone in the back of a rig.  Patient may also need oxygen for transport and she is confused and can not manage it herself.  Call placed to [facility] to arrange for stretcher transport at 15:00 today.  Patient's daughter Vivian informed of the plan and in agreement to the plan.  Call placed to update [facility] and faxed the orders.  Patient does not need a PAS as she was admitted from there.

## 2019-03-18 NOTE — TELEPHONE ENCOUNTER
Nurse called, patient in bed, speech slurred, cold blue feet and hands, reports not feeling very well, VS stable.  Had nurse contact daughter with suggestion to send to ER, daughter declined, wanted nurse to ask patient, patient declines going to ER also.      Recent hospitalization 3/11-14 for sepsis due to pneumonia, on ABX.    Plan: stat CBC/BMP, keep in bed covered, VS Qshift.

## 2019-03-20 NOTE — TELEPHONE ENCOUNTER
Patient recently treated for pneumonia, still weak, cold extremities.     CXR shows congestive heart failure.   Lab Results   Component Value Date    WBC 8.4 03/19/2019    WBC 10.8 03/14/2019     Lab Results   Component Value Date    CR 1.26 03/19/2019    CR 1.05 03/14/2019     PLAN  Estimated Creatinine Clearance: 22.6 mL/min (A) (based on SCr of 1.26 mg/dL (H)).  DuoNebs 1 neb QID x 7 days  Levaquin 500 mg PO today, 250 mg PO daily x 6 days starting tomorrow  CBC and BMP on 3/22  Lasix 20 mg one time dose  If family so wish may offer ED eval.     Electronically signed by DEAN Che, GNP

## 2019-03-22 NOTE — TELEPHONE ENCOUNTER
Patient's labs resulted as below - per nursing she has been being tx for PNA and is not getting better; family not interested in Hospice at this time. She appears to be dehydrated with the presence of hypernatremia which could be causing her lethargy/fatigue/non-improvement    Results for ISRRAEL WHITING (MRN [id number]) as of 3/22/2019 16:20   Ref. Range 3/22/2019 05:25   Sodium Latest Ref Range: 133 - 144 mmol/L 150 (H)   Potassium Latest Ref Range: 3.4 - 5.3 mmol/L 3.8   Chloride Latest Ref Range: 94 - 109 mmol/L 116 (H)   Carbon Dioxide Latest Ref Range: 20 - 32 mmol/L 27   Urea Nitrogen Latest Ref Range: 7 - 30 mg/dL 80 (H)   Creatinine Latest Ref Range: 0.52 - 1.04 mg/dL 2.30 (H)   GFR Estimate Latest Ref Range: >60 mL/min/1.73_m2 17 (L)   GFR Estimate If Black Latest Ref Range: >60 mL/min/1.73_m2 20 (L)   Calcium Latest Ref Range: 8.5 - 10.1 mg/dL 8.1 (L)   Anion Gap Latest Ref Range: 3 - 14 mmol/L 7     -0.45% NaCl 1L - 75cc/hr; recheck BMP STAT after completion    Dr. Martha Reid, APRN, [facility]  Phone: [phone]  Fax: [phone]